# Patient Record
Sex: MALE | Race: WHITE | NOT HISPANIC OR LATINO | Employment: OTHER | URBAN - METROPOLITAN AREA
[De-identification: names, ages, dates, MRNs, and addresses within clinical notes are randomized per-mention and may not be internally consistent; named-entity substitution may affect disease eponyms.]

---

## 2023-03-21 ENCOUNTER — TELEPHONE (OUTPATIENT)
Dept: PRIMARY CARE | Facility: CLINIC | Age: 78
End: 2023-03-21
Payer: MEDICARE

## 2023-03-25 LAB — URINE CULTURE: ABNORMAL

## 2023-11-28 ENCOUNTER — OFFICE VISIT (OUTPATIENT)
Dept: PRIMARY CARE | Facility: CLINIC | Age: 78
End: 2023-11-28
Payer: MEDICARE

## 2023-11-28 ENCOUNTER — LAB (OUTPATIENT)
Dept: LAB | Facility: LAB | Age: 78
End: 2023-11-28
Payer: MEDICARE

## 2023-11-28 VITALS
HEART RATE: 88 BPM | WEIGHT: 203 LBS | TEMPERATURE: 97.3 F | HEIGHT: 70 IN | OXYGEN SATURATION: 96 % | BODY MASS INDEX: 29.06 KG/M2 | DIASTOLIC BLOOD PRESSURE: 60 MMHG | SYSTOLIC BLOOD PRESSURE: 104 MMHG

## 2023-11-28 DIAGNOSIS — C61 PROSTATE CANCER METASTATIC TO BONE (MULTI): ICD-10-CM

## 2023-11-28 DIAGNOSIS — Z00.00 MEDICARE ANNUAL WELLNESS VISIT, SUBSEQUENT: Primary | ICD-10-CM

## 2023-11-28 DIAGNOSIS — C79.51 PROSTATE CANCER METASTATIC TO BONE (MULTI): ICD-10-CM

## 2023-11-28 DIAGNOSIS — R17 JAUNDICE: ICD-10-CM

## 2023-11-28 DIAGNOSIS — I71.40 ABDOMINAL AORTIC ANEURYSM (AAA) WITHOUT RUPTURE, UNSPECIFIED PART (CMS-HCC): ICD-10-CM

## 2023-11-28 DIAGNOSIS — E11.9 TYPE 2 DIABETES MELLITUS WITHOUT COMPLICATION, WITHOUT LONG-TERM CURRENT USE OF INSULIN (MULTI): ICD-10-CM

## 2023-11-28 DIAGNOSIS — E78.5 HYPERLIPIDEMIA, UNSPECIFIED HYPERLIPIDEMIA TYPE: ICD-10-CM

## 2023-11-28 PROBLEM — M85.80 OSTEOPENIA: Status: ACTIVE | Noted: 2023-11-28

## 2023-11-28 PROBLEM — R33.9 URINARY RETENTION: Status: RESOLVED | Noted: 2020-11-30 | Resolved: 2023-11-28

## 2023-11-28 PROBLEM — R35.1 NOCTURIA: Status: ACTIVE | Noted: 2020-11-30

## 2023-11-28 LAB
ALBUMIN SERPL BCP-MCNC: 3.7 G/DL (ref 3.4–5)
ALP SERPL-CCNC: 127 U/L (ref 33–136)
ALT SERPL W P-5'-P-CCNC: 179 U/L (ref 10–52)
ANION GAP SERPL CALC-SCNC: 11 MMOL/L (ref 10–20)
AST SERPL W P-5'-P-CCNC: 100 U/L (ref 9–39)
BASOPHILS # BLD AUTO: 0.04 X10*3/UL (ref 0–0.1)
BASOPHILS NFR BLD AUTO: 0.7 %
BILIRUB SERPL-MCNC: 8 MG/DL (ref 0–1.2)
BUN SERPL-MCNC: 11 MG/DL (ref 6–23)
CALCIUM SERPL-MCNC: 8.8 MG/DL (ref 8.6–10.3)
CHLORIDE SERPL-SCNC: 103 MMOL/L (ref 98–107)
CO2 SERPL-SCNC: 29 MMOL/L (ref 21–32)
CREAT SERPL-MCNC: 0.89 MG/DL (ref 0.5–1.3)
EOSINOPHIL # BLD AUTO: 0.13 X10*3/UL (ref 0–0.4)
EOSINOPHIL NFR BLD AUTO: 2.3 %
ERYTHROCYTE [DISTWIDTH] IN BLOOD BY AUTOMATED COUNT: 14.8 % (ref 11.5–14.5)
GFR SERPL CREATININE-BSD FRML MDRD: 87 ML/MIN/1.73M*2
GLUCOSE SERPL-MCNC: 128 MG/DL (ref 74–99)
HCT VFR BLD AUTO: 41.1 % (ref 41–52)
HGB BLD-MCNC: 13.3 G/DL (ref 13.5–17.5)
IMM GRANULOCYTES # BLD AUTO: 0.01 X10*3/UL (ref 0–0.5)
IMM GRANULOCYTES NFR BLD AUTO: 0.2 % (ref 0–0.9)
LYMPHOCYTES # BLD AUTO: 0.91 X10*3/UL (ref 0.8–3)
LYMPHOCYTES NFR BLD AUTO: 16 %
MCH RBC QN AUTO: 31.4 PG (ref 26–34)
MCHC RBC AUTO-ENTMCNC: 32.4 G/DL (ref 32–36)
MCV RBC AUTO: 97 FL (ref 80–100)
MONOCYTES # BLD AUTO: 0.71 X10*3/UL (ref 0.05–0.8)
MONOCYTES NFR BLD AUTO: 12.5 %
NEUTROPHILS # BLD AUTO: 3.87 X10*3/UL (ref 1.6–5.5)
NEUTROPHILS NFR BLD AUTO: 68.3 %
NRBC BLD-RTO: 0 /100 WBCS (ref 0–0)
PLATELET # BLD AUTO: 182 X10*3/UL (ref 150–450)
POTASSIUM SERPL-SCNC: 3.8 MMOL/L (ref 3.5–5.3)
PROT SERPL-MCNC: 6.4 G/DL (ref 6.4–8.2)
RBC # BLD AUTO: 4.23 X10*6/UL (ref 4.5–5.9)
SODIUM SERPL-SCNC: 139 MMOL/L (ref 136–145)
WBC # BLD AUTO: 5.7 X10*3/UL (ref 4.4–11.3)

## 2023-11-28 PROCEDURE — 1159F MED LIST DOCD IN RCRD: CPT | Performed by: NURSE PRACTITIONER

## 2023-11-28 PROCEDURE — 80053 COMPREHEN METABOLIC PANEL: CPT

## 2023-11-28 PROCEDURE — 1160F RVW MEDS BY RX/DR IN RCRD: CPT | Performed by: NURSE PRACTITIONER

## 2023-11-28 PROCEDURE — 99213 OFFICE O/P EST LOW 20 MIN: CPT | Performed by: NURSE PRACTITIONER

## 2023-11-28 PROCEDURE — G0439 PPPS, SUBSEQ VISIT: HCPCS | Performed by: NURSE PRACTITIONER

## 2023-11-28 PROCEDURE — 85025 COMPLETE CBC W/AUTO DIFF WBC: CPT

## 2023-11-28 PROCEDURE — 1170F FXNL STATUS ASSESSED: CPT | Performed by: NURSE PRACTITIONER

## 2023-11-28 PROCEDURE — 36415 COLL VENOUS BLD VENIPUNCTURE: CPT

## 2023-11-28 RX ORDER — ABIRATERONE ACETATE 250 MG/1
1000 TABLET ORAL DAILY
COMMUNITY
Start: 2021-08-20

## 2023-11-28 RX ORDER — ATORVASTATIN CALCIUM 40 MG/1
1 TABLET, FILM COATED ORAL DAILY
COMMUNITY
Start: 2022-06-28

## 2023-11-28 RX ORDER — SEMAGLUTIDE 1.34 MG/ML
INJECTION, SOLUTION SUBCUTANEOUS
COMMUNITY
Start: 2022-11-30

## 2023-11-28 RX ORDER — PREDNISONE 5 MG/1
5 TABLET ORAL DAILY
COMMUNITY
Start: 2022-06-28

## 2023-11-28 RX ORDER — VARENICLINE TARTRATE 1 MG/1
TABLET, FILM COATED ORAL
COMMUNITY
Start: 2023-11-14

## 2023-11-28 RX ORDER — VARENICLINE TARTRATE 0.5 MG/1
TABLET, FILM COATED ORAL
COMMUNITY
Start: 2023-11-14

## 2023-11-28 RX ORDER — ALFUZOSIN HYDROCHLORIDE 10 MG/1
1 TABLET, EXTENDED RELEASE ORAL DAILY
COMMUNITY
Start: 2022-06-28

## 2023-11-28 RX ORDER — CALCIUM CITRATE/VITAMIN D3 200MG-6.25
TABLET ORAL
COMMUNITY
Start: 2023-11-14 | End: 2023-11-28 | Stop reason: ALTCHOICE

## 2023-11-28 RX ORDER — ASPIRIN 81 MG/1
81 TABLET ORAL
COMMUNITY

## 2023-11-28 RX ORDER — METFORMIN HYDROCHLORIDE 500 MG/1
1000 TABLET, EXTENDED RELEASE ORAL
COMMUNITY
Start: 2023-09-08

## 2023-11-28 ASSESSMENT — ENCOUNTER SYMPTOMS
FATIGUE: 0
SHORTNESS OF BREATH: 0
DIARRHEA: 0
ABDOMINAL PAIN: 0
NAUSEA: 0
NUMBNESS: 0
FEVER: 0
HEADACHES: 0
PALPITATIONS: 0
COUGH: 0
DIZZINESS: 0
CHEST TIGHTNESS: 0
VOMITING: 0
CHILLS: 0
WEAKNESS: 0

## 2023-11-28 ASSESSMENT — ACTIVITIES OF DAILY LIVING (ADL)
TAKING_MEDICATION: INDEPENDENT
BATHING: INDEPENDENT
GROCERY_SHOPPING: INDEPENDENT
DRESSING: INDEPENDENT
DOING_HOUSEWORK: INDEPENDENT
MANAGING_FINANCES: INDEPENDENT

## 2023-11-28 ASSESSMENT — PATIENT HEALTH QUESTIONNAIRE - PHQ9
SUM OF ALL RESPONSES TO PHQ9 QUESTIONS 1 AND 2: 0
2. FEELING DOWN, DEPRESSED OR HOPELESS: NOT AT ALL
1. LITTLE INTEREST OR PLEASURE IN DOING THINGS: NOT AT ALL
SUM OF ALL RESPONSES TO PHQ9 QUESTIONS 1 AND 2: 0
2. FEELING DOWN, DEPRESSED OR HOPELESS: NOT AT ALL
1. LITTLE INTEREST OR PLEASURE IN DOING THINGS: NOT AT ALL

## 2023-11-28 NOTE — PROGRESS NOTES
"Subjective   Reason for Visit: Sydney Fitzpatrick is an 78 y.o. male here for a Medicare Wellness visit.     Past Medical, Surgical, and Family History reviewed and updated in chart.    Reviewed all medications by prescribing practitioner or clinical pharmacist (such as prescriptions, OTCs, herbal therapies and supplements) and documented in the medical record.    HPI  He presents to the office today for a Medicare Wellness Visit and follow up. [facility]  He reports he is doing well and has not concerns today.  Monday morning 3 weeks ago had an episode of vomiting 7 times. He has not had any further.  No abdominal pain reported.  No nausea, vomiting, diarrhea or constipation.  No fever or chills.   No constipation.  [facility].  Of note he appears jaundice today.   He reports he has not noticed a difference in his color nor has his wife.   He had [facility] at the [facility] 2 weeks ago (not available to review).    Checks blood sugars once a week- ranging   [facility] management [facility].  [facility]    Patient Care Team:  YVON Ryan-CNP as PCP - General     Review of Systems   Constitutional:  Negative for chills, fatigue and fever.   Eyes:  Negative for visual disturbance.   Respiratory:  Negative for cough, chest tightness and shortness of breath.    Cardiovascular:  Negative for chest pain, palpitations and leg swelling.   Gastrointestinal:  Negative for abdominal pain, diarrhea, nausea and vomiting.   Neurological:  Negative for dizziness, weakness, numbness and headaches.     Objective   Vitals:  /60 (BP Location: Left arm, Patient Position: Sitting)   Pulse 88   Temp 36.3 °C (97.3 °F) (Temporal)   Ht 1.766 m (5' 9.53\")   Wt 92.1 kg (203 lb)   SpO2 96%   BMI 29.52 kg/m²       Physical Exam  Constitutional:       " General: He is not in acute distress.     Appearance: Normal appearance. He is not toxic-appearing.      Comments: (+) icterus   Eyes:      General: Scleral icterus present.      Extraocular Movements: Extraocular movements intact.      Conjunctiva/sclera: Conjunctivae normal.      Pupils: Pupils are equal, round, and reactive to light.   Neck:      Vascular: No carotid bruit.   Cardiovascular:      Rate and Rhythm: Normal rate and regular rhythm.      Pulses: Normal pulses.      Heart sounds: Normal heart sounds, S1 normal and S2 normal. No murmur heard.  Pulmonary:      Effort: Pulmonary effort is normal. No respiratory distress.      Breath sounds: Normal breath sounds.   Abdominal:      General: Bowel sounds are normal.      Palpations: Abdomen is soft.      Tenderness: There is no abdominal tenderness.   Musculoskeletal:      Right lower leg: No edema.      Left lower leg: No edema.   Lymphadenopathy:      Cervical: No cervical adenopathy.   Neurological:      Mental Status: He is alert and oriented to person, place, and time.   Psychiatric:         Attention and Perception: Attention normal.         Mood and Affect: Mood and affect normal.         Behavior: Behavior normal. Behavior is cooperative.         Thought Content: Thought content normal.         Cognition and Memory: Cognition normal.         Judgment: Judgment normal.       Assessment/Plan   Problem List Items Addressed This Visit       Abdominal aortic aneurysm (CMS/HCC)    Overview     Formatting of this note might be different from the original. - Discovered on screening - Increased in size to 4.2 cm - follows with vascular Last Assessment & Plan: Formatting of this note might be different from the original. - awaiting CTA - unsure if will continue to follow with vascular here or wait until he moves         Current Assessment & Plan     Had recent Abdominal aortic US- [facility].         Diabetes mellitus, type II (CMS/HCC)    Overview      Last Assessment & Plan: Formatting of this note might be different from the original. - at goal without hypoglycemia - on appropriate risk reduction medication         Current Assessment & Plan     Will request labs from the [facility]- managed by [facility].         Prostate cancer metastatic to bone (CMS/HCC)    Overview     Last Assessment & Plan: Formatting of this note might be different from the original. - PSA currently undetectable - on 2 antiandrogens - also on medication for bone metastases - follows with urology         Current Assessment & Plan     [facility].         Hyperlipidemia    Current Assessment & Plan     On Statin.         Medicare annual wellness visit, subsequent - Primary    Current Assessment & Plan     UTD on age appropriate screenings and vaccines.         Jaundice    Current Assessment & Plan     He reports he is feeling great- no concerns today. He has not noticed the skin discoloration/yellowing.  He had a vomiting episode about 3 weeks ago but otherwise denies any other symptoms. No fever or chills.   Will recheck labs today. Advised many of his meds can also affect his liver.         Relevant Orders    Comprehensive Metabolic Panel (Completed)    CBC and Auto Differential (Completed)

## 2023-11-29 ENCOUNTER — TELEPHONE (OUTPATIENT)
Dept: PRIMARY CARE | Facility: CLINIC | Age: 78
End: 2023-11-29
Payer: MEDICARE

## 2023-11-29 NOTE — ASSESSMENT & PLAN NOTE
He reports he is feeling great- no concerns today. He has not noticed the skin discoloration/yellowing.  He had a vomiting episode about 3 weeks ago but otherwise denies any other symptoms. No fever or chills.   Will recheck labs today. Advised many of his meds can also affect his liver.

## 2023-11-29 NOTE — TELEPHONE ENCOUNTER
Pts wife stopped in because she wanted to know how Casey was doing in surgery. She said he is having Surgery at [facility] for his gallbladder and she wanted you to call her back at [phone].

## 2023-12-04 ENCOUNTER — PATIENT OUTREACH (OUTPATIENT)
Dept: PRIMARY CARE | Facility: CLINIC | Age: 78
End: 2023-12-04
Payer: MEDICARE

## 2023-12-04 RX ORDER — OXYCODONE HYDROCHLORIDE 5 MG/1
5 TABLET ORAL EVERY 6 HOURS PRN
COMMUNITY
Start: 2023-12-03 | End: 2023-12-06

## 2023-12-04 RX ORDER — AMOXICILLIN AND CLAVULANATE POTASSIUM 500; 125 MG/1; MG/1
TABLET, FILM COATED ORAL
COMMUNITY
Start: 2016-04-12 | End: 2023-12-11 | Stop reason: ALTCHOICE

## 2023-12-04 NOTE — PROGRESS NOTES
Discharge Facility: [facility]   Discharge Diagnosis: Choledocholithiasis with acute cholecystiti   Admission Date: 11-29-23  Discharge Date: 12-3-23     PCP Appointment Date: 12-11-23   Specialist Appointment Date:  n/a   Hospital Encounter and Summary:  not available at this time   See discharge assessment below for further details  Engagement  Call Start Time: 1019 (12/4/2023 10:21 AM)    Medications  Medications reviewed with patient/caregiver?: Yes (12/4/2023 10:21 AM)  Is the patient having any side effects they believe may be caused by any medication additions or changes?: No (12/4/2023 10:21 AM)  Does the patient have all medications ordered at discharge?: Yes (12/4/2023 10:21 AM)  Care Management Interventions: No intervention needed (12/4/2023 10:21 AM)  Is the patient taking all medications as directed (includes completed medication regime)?: Yes (12/4/2023 10:21 AM)  Care Management Interventions: Provided patient education (12/4/2023 10:21 AM)    Appointments  Does the patient have a primary care provider?: Yes (12/4/2023 10:21 AM)  Care Management Interventions: Verified appointment date/time/provider (12/4/2023 10:21 AM)  Has the patient kept scheduled appointments due by today?: Yes (12/4/2023 10:21 AM)  Care Management Interventions: Advised patient to keep appointment (12/4/2023 10:21 AM)    Self Management  Has home health visited the patient within 72 hours of discharge?: Not applicable (12/4/2023 10:21 AM)    Patient Teaching  Does the patient have access to their discharge instructions?: Yes (12/4/2023 10:21 AM)  Care Management Interventions: Reviewed instructions with patient (12/4/2023 10:21 AM)  What is the patient's perception of their health status since discharge?: Improving (12/4/2023 10:21 AM)  Is the patient/caregiver able to teach back the hierarchy of who to call/visit for symptoms/problems? PCP, Specialist, Home Health nurse, Urgent Care, ED, 911: Yes (12/4/2023 10:21  AM)      Simon Oliver LPN

## 2023-12-11 ENCOUNTER — OFFICE VISIT (OUTPATIENT)
Dept: PRIMARY CARE | Facility: CLINIC | Age: 78
End: 2023-12-11
Payer: MEDICARE

## 2023-12-11 VITALS
OXYGEN SATURATION: 95 % | BODY MASS INDEX: 28.62 KG/M2 | TEMPERATURE: 97.9 F | HEART RATE: 89 BPM | DIASTOLIC BLOOD PRESSURE: 62 MMHG | SYSTOLIC BLOOD PRESSURE: 126 MMHG | WEIGHT: 196.8 LBS

## 2023-12-11 DIAGNOSIS — K80.50 CHOLEDOCHOLITHIASIS: Primary | ICD-10-CM

## 2023-12-11 DIAGNOSIS — R17 JAUNDICE: ICD-10-CM

## 2023-12-11 DIAGNOSIS — K80.81 BILIARY CALCULUS OF OTHER SITE WITH OBSTRUCTION: ICD-10-CM

## 2023-12-11 DIAGNOSIS — D64.9 ANEMIA, UNSPECIFIED TYPE: ICD-10-CM

## 2023-12-11 PROBLEM — K80.20 CHOLELITHIASIS: Status: ACTIVE | Noted: 2023-12-11

## 2023-12-11 PROCEDURE — 1159F MED LIST DOCD IN RCRD: CPT | Performed by: NURSE PRACTITIONER

## 2023-12-11 PROCEDURE — 1160F RVW MEDS BY RX/DR IN RCRD: CPT | Performed by: NURSE PRACTITIONER

## 2023-12-11 PROCEDURE — 99495 TRANSJ CARE MGMT MOD F2F 14D: CPT | Performed by: NURSE PRACTITIONER

## 2023-12-11 ASSESSMENT — ENCOUNTER SYMPTOMS
DIARRHEA: 1
CONSTIPATION: 0
ABDOMINAL PAIN: 1
SHORTNESS OF BREATH: 0
CHILLS: 0
COUGH: 0
NAUSEA: 0
DIZZINESS: 0
PALPITATIONS: 0
VOMITING: 0
CHEST TIGHTNESS: 0
FATIGUE: 0
FEVER: 0

## 2023-12-11 ASSESSMENT — PATIENT HEALTH QUESTIONNAIRE - PHQ9
1. LITTLE INTEREST OR PLEASURE IN DOING THINGS: NOT AT ALL
SUM OF ALL RESPONSES TO PHQ9 QUESTIONS 1 AND 2: 0
2. FEELING DOWN, DEPRESSED OR HOPELESS: NOT AT ALL

## 2023-12-11 NOTE — PROGRESS NOTES
Subjective    Sydnye Fitzpatrick is a 78 y.o. male who presents for Hospital Follow-up (Removal of gallbladder).    HPI  He presents to the office today for a hospital follow up. He was discharged 12/3.  He was sent to the ER after last visit with jaundice and elevated bilirubin 8.0.  CT abdomen and pelvis (11/29) showed cholelithiasis with mild pericholecystic fluid and gallbladder wall thickening consistent with acute cholecystitis.  Dilated CBD with intraluminal material within the distal duct concerning for choledocholithiasis. ERCP on 11/30 with   sphincterotomy/stent placement.    On 12/01/2023 had a laparoscopic cholecystectomy (Inflamed gallbladder with sludge, adhesions noted to the omentum).    He had post-op hypoxia, CXR with   bilateral pleural effusions, elevated BNP., Lasix given x 1 dose.     He reports he has been walking around.  He reports he is passing gas.  He reports he has been having explosive diarrhea but usually once a day but at times mores. He reports this was present well before the gallbladder issues.  He reports he is not having pain all the time but still has pain with deep breaths which has been present since leaving the hospital.  It is also hard to lay down and get comfortable at [facility].  He still has sharp pains at times.  No improvement and not worse from discharge.  No fever or chills.   Appetite has not been good.  Weight is done about 7 lbs since last visit.   No nausea or vomiting.     No chest pain, palpitation or SOB.  No cough.   He has a surgery follow up 10/18/2023    Review of Systems   Constitutional:  Negative for chills, fatigue and fever.   Eyes:  Negative for visual disturbance.   Respiratory:  Negative for cough, chest tightness and shortness of breath.    Cardiovascular:  Negative for chest pain, palpitations and leg swelling.   Gastrointestinal:  Positive for abdominal pain and diarrhea. Negative for constipation, nausea and vomiting.   Neurological:  Negative for  dizziness.     Objective   /62 (BP Location: Left arm, Patient Position: Sitting)   Pulse 89   Temp 36.6 °C (97.9 °F) (Temporal)   Wt 89.3 kg (196 lb 12.8 oz)   SpO2 95%   BMI 28.62 kg/m²     Physical Exam  Constitutional:       General: He is not in acute distress.     Appearance: Normal appearance. He is not toxic-appearing.   Eyes:      Extraocular Movements: Extraocular movements intact.      Conjunctiva/sclera: Conjunctivae normal.      Pupils: Pupils are equal, round, and reactive to light.   Neck:      Vascular: No carotid bruit.   Cardiovascular:      Rate and Rhythm: Normal rate and regular rhythm.      Pulses: Normal pulses.      Heart sounds: Normal heart sounds, S1 normal and S2 normal. No murmur heard.  Pulmonary:      Effort: Pulmonary effort is normal. No respiratory distress.      Breath sounds: Normal breath sounds.   Abdominal:      General: Bowel sounds are normal.      Palpations: Abdomen is soft.      Tenderness: There is no abdominal tenderness.      Comments: 5 lap sites all healing well.   (+) tenderness upon palpation to the right side of the  abdomen.   Musculoskeletal:      Right lower leg: No edema.      Left lower leg: No edema.   Lymphadenopathy:      Cervical: No cervical adenopathy.   Neurological:      Mental Status: He is alert and oriented to person, place, and time.   Psychiatric:         Attention and Perception: Attention normal.         Mood and Affect: Mood and affect normal.         Behavior: Behavior normal. Behavior is cooperative.         Thought Content: Thought content normal.         Cognition and Memory: Cognition normal.         Judgment: Judgment normal.         Assessment/Plan   Problem List Items Addressed This Visit       Jaundice     Resolved.          Choledocholithiasis     Symptoms better- needs to follow up with GI in 6-8 weeks.  Still with abdominal pain post surgery-mainly with deep breath.         Anemia - Primary     Check labs to ensure  stable.         Relevant Orders    CBC    Cholelithiasis    Relevant Orders    Comprehensive Metabolic Panel       It has been a pleasure seeing you today!

## 2023-12-12 NOTE — ASSESSMENT & PLAN NOTE
Symptoms better- needs to follow up with GI in 6-8 weeks.  Still with abdominal pain post surgery-mainly with deep breath.

## 2023-12-19 ENCOUNTER — PATIENT OUTREACH (OUTPATIENT)
Dept: PRIMARY CARE | Facility: CLINIC | Age: 78
End: 2023-12-19
Payer: MEDICARE

## 2023-12-19 NOTE — PROGRESS NOTES
Unable to reach patient for call back after patient's follow up appointment with PCP.   LVM with call back number for patient to call if needed   If no voicemail available call attempts x 2 were made to contact the patient to assist with any questions or concerns patient may have.    Simon Oliver LPN

## 2024-01-03 ENCOUNTER — PATIENT OUTREACH (OUTPATIENT)
Dept: CARE COORDINATION | Facility: CLINIC | Age: 79
End: 2024-01-03
Payer: MEDICARE

## 2024-01-03 NOTE — PROGRESS NOTES
Unable to reach patient for one month post discharge follow up call.   LVM with call back number for patient to call if needed   If no voicemail available call attempts x 2 were made to contact the patient to assist with any questions or concerns patient may have.  Simon Oliver LPN

## 2024-01-23 ENCOUNTER — TELEPHONE (OUTPATIENT)
Dept: PRIMARY CARE | Facility: CLINIC | Age: 79
End: 2024-01-23
Payer: MEDICARE

## 2024-01-23 NOTE — TELEPHONE ENCOUNTER
Pt said he doesn't know a Dr Neves but it might be someone from his gallbladder surgery - pt does give you permission to speak with this Doctor.

## 2024-01-23 NOTE — TELEPHONE ENCOUNTER
Dr Neves is requesting you call him. He did not go into detail on why he wanted you to call him.    Best number is [phone]

## 2024-03-04 ENCOUNTER — PATIENT OUTREACH (OUTPATIENT)
Dept: CARE COORDINATION | Facility: CLINIC | Age: 79
End: 2024-03-04
Payer: MEDICARE

## 2024-03-04 NOTE — PROGRESS NOTES
Call placed regarding 90 days post discharge follow up call.  At time of outreach call the patient feels as if their condition has (improved) since initial visit with PCP or specialist.  Questions or concerns regarding recovery period addressed at this time.   Reviewed any PCP or specialists progress notes/labs/radiology reports if applicable and addressed any questions or concerns.     PCP Requested Referral   Follow-Up Appointment   Please make an appointment with Dr. Dai for an ERCP in 6-8 weeks for removal of plastic biliary stent and clearance of the bile duct stones left in situ.   When: In 6 weeks   Patient/Parents to call for appointment?: Yes   Jose Angel Dai MD   [phone]   [address] [place] [address]  [place] [address]     Pt. Was sent home from [facility] with discharge instructions to follow up with the provider listed above. When patient calls to schedule follow up per discharge papers, they tell him this is not the correct office.     Pt. Still has the Plastic Biliary Stent in place.    Pt. Is going to call the number provided to him on this date.         Simon Oliver LPN

## 2024-04-02 ENCOUNTER — DOCUMENTATION (OUTPATIENT)
Dept: PRIMARY CARE | Facility: CLINIC | Age: 79
End: 2024-04-02
Payer: MEDICARE

## 2024-04-03 ENCOUNTER — PATIENT OUTREACH (OUTPATIENT)
Dept: CARE COORDINATION | Facility: CLINIC | Age: 79
End: 2024-04-03
Payer: MEDICARE

## 2024-04-03 NOTE — PROGRESS NOTES
Discharge Facility: [facility]   Discharge Diagnosis: Elevated troponin   Admission Date: 3-30-24   Discharge Date: 4-1-24     PCP Appointment Date: 4-5-24  Specialist Appointment Date:  Cardiology   YVON Yanez.CNP (Attending)  NPI: [id number]  [phone] (Work)  [phone] (Fax)  [address] [place] [address]  [facility] Encounter and Summary: Linked   See discharge assessment below for further details  Engagement  Call Start Time: 1316 (4/3/2024  1:33 PM)    Medications  Medications reviewed with patient/caregiver?: Yes (4/3/2024  1:33 PM)  Is the patient having any side effects they believe may be caused by any medication additions or changes?: No (4/3/2024  1:33 PM)  Does the patient have all medications ordered at discharge?: Yes (4/3/2024  1:33 PM)  Care Management Interventions: No intervention needed (4/3/2024  1:33 PM)  Is the patient taking all medications as directed (includes completed medication regime)?: Yes (4/3/2024  1:33 PM)    Appointments  Does the patient have a primary care provider?: Yes (4/3/2024  1:33 PM)  Care Management Interventions: Verified appointment date/time/provider (4/3/2024  1:33 PM)  Has the patient kept scheduled appointments due by today?: Yes (4/3/2024  1:33 PM)  Care Management Interventions: Advised patient to keep appointment (4/3/2024  1:33 PM)    Patient Teaching  Does the patient have access to their discharge instructions?: Yes (4/3/2024  1:33 PM)  Care Management Interventions: Reviewed instructions with patient (4/3/2024  1:33 PM)  What is the patient's perception of their health status since discharge?: Improving (4/3/2024  1:33 PM)  Is the patient/caregiver able to teach back the hierarchy of who to call/visit for symptoms/problems? PCP, Specialist, Home Health nurse, Urgent Care, ED, 911: Yes (4/3/2024  1:33 PM)      Simon Oliver LPN

## 2024-04-05 ENCOUNTER — OFFICE VISIT (OUTPATIENT)
Dept: PRIMARY CARE | Facility: CLINIC | Age: 79
End: 2024-04-05
Payer: MEDICARE

## 2024-04-05 VITALS
DIASTOLIC BLOOD PRESSURE: 70 MMHG | TEMPERATURE: 98 F | OXYGEN SATURATION: 98 % | WEIGHT: 202.8 LBS | BODY MASS INDEX: 29.5 KG/M2 | SYSTOLIC BLOOD PRESSURE: 110 MMHG | HEART RATE: 88 BPM

## 2024-04-05 DIAGNOSIS — I10 HYPERTENSION, UNSPECIFIED TYPE: Primary | ICD-10-CM

## 2024-04-05 DIAGNOSIS — E87.1 HYPONATREMIA: ICD-10-CM

## 2024-04-05 DIAGNOSIS — R91.1 PULMONARY NODULE: ICD-10-CM

## 2024-04-05 DIAGNOSIS — C79.51 PROSTATE CANCER METASTATIC TO BONE (MULTI): ICD-10-CM

## 2024-04-05 DIAGNOSIS — I49.8 BIGEMINY: ICD-10-CM

## 2024-04-05 DIAGNOSIS — I71.40 ABDOMINAL AORTIC ANEURYSM (AAA) WITHOUT RUPTURE, UNSPECIFIED PART (CMS-HCC): ICD-10-CM

## 2024-04-05 DIAGNOSIS — C61 PROSTATE CANCER METASTATIC TO BONE (MULTI): ICD-10-CM

## 2024-04-05 PROCEDURE — 1159F MED LIST DOCD IN RCRD: CPT | Performed by: NURSE PRACTITIONER

## 2024-04-05 PROCEDURE — 1160F RVW MEDS BY RX/DR IN RCRD: CPT | Performed by: NURSE PRACTITIONER

## 2024-04-05 PROCEDURE — 3078F DIAST BP <80 MM HG: CPT | Performed by: NURSE PRACTITIONER

## 2024-04-05 PROCEDURE — 3074F SYST BP LT 130 MM HG: CPT | Performed by: NURSE PRACTITIONER

## 2024-04-05 PROCEDURE — 99495 TRANSJ CARE MGMT MOD F2F 14D: CPT | Performed by: NURSE PRACTITIONER

## 2024-04-05 RX ORDER — LISINOPRIL 20 MG/1
20 TABLET ORAL
COMMUNITY
Start: 2024-04-02

## 2024-04-05 RX ORDER — AMLODIPINE BESYLATE 10 MG/1
10 TABLET ORAL
COMMUNITY
Start: 2024-04-02

## 2024-04-05 ASSESSMENT — ENCOUNTER SYMPTOMS
OCCASIONAL FEELINGS OF UNSTEADINESS: 0
LOSS OF SENSATION IN FEET: 0
DEPRESSION: 0

## 2024-04-05 ASSESSMENT — PATIENT HEALTH QUESTIONNAIRE - PHQ9
1. LITTLE INTEREST OR PLEASURE IN DOING THINGS: NOT AT ALL
SUM OF ALL RESPONSES TO PHQ9 QUESTIONS 1 & 2: 0
2. FEELING DOWN, DEPRESSED OR HOPELESS: NOT AT ALL

## 2024-04-05 NOTE — PATIENT INSTRUCTIONS
Continue medications at the current dose.  Continue to check home BP readings.  Let me know if you have readings less than 100/60.

## 2024-04-05 NOTE — PROGRESS NOTES
Subjective   Sydney Fitzpatrick is a 79 y.o. male who presents for Hospital Follow-up ([facility] hospital stay on [facility]).    HPI  He presents to the office today for a hospital follow up. He reports on 3/30/2024 felt out of sorts and had a headache. Head felt like it was it was going to explode.   He had to take his wife to urgent care and had his blood pressure checked while there which was extremely elevated and he was sent to the ER.  BP was 203/127 in the ER.  He has never had issues with his blood pressure in the past and was not on medications.  His troponin was elevated and he was admitted.  He had an ECHO which showed EF 52% and mild LVH  He had a Holter monitor placed prior to discharge due to bigeminy while in [facility].   No chest pain, SOB or palpitations.   No numbness/tingling/weakness or dizziness.  No vision changes.  Home BP readings at home 130/86  He reports he is feeling much better now.  He does not have any concerns today.  While he was in the hospital there was a pulmonary nodule noted on his chest x-ray.  He has a follow-up with the nodule clinic at [facility].    He has an appointment with a nodule clinic on 6/20/2024  Has a follow up with cardiology on 5/13/2024        Review of Systems   Constitutional:  Negative for chills, fatigue and fever.   Eyes:  Negative for visual disturbance.   Respiratory:  Negative for cough, chest tightness and shortness of breath.    Cardiovascular:  Negative for chest pain, palpitations and leg swelling.   Gastrointestinal:  Negative for abdominal pain, nausea and vomiting.   Neurological:  Negative for dizziness, weakness, numbness and headaches.       Objective   /70   Pulse 88   Temp 36.7 °C (98 °F) (Temporal)   Wt 92 kg (202 lb 12.8 oz)   SpO2 98%   BMI 29.50 kg/m²     Physical Exam  Constitutional:       General: He is not in acute distress.     Appearance: Normal appearance. He is not toxic-appearing.   Eyes:      Extraocular Movements:  Extraocular movements intact.      Conjunctiva/sclera: Conjunctivae normal.      Pupils: Pupils are equal, round, and reactive to light.   Neck:      Vascular: No carotid bruit.   Cardiovascular:      Rate and Rhythm: Normal rate and regular rhythm.      Pulses: Normal pulses.      Heart sounds: Normal heart sounds, S1 normal and S2 normal. No murmur heard.  Pulmonary:      Effort: Pulmonary effort is normal. No respiratory distress.      Breath sounds: Normal breath sounds.   Abdominal:      General: Bowel sounds are normal.      Palpations: Abdomen is soft.      Tenderness: There is no abdominal tenderness.   Musculoskeletal:      Right lower leg: No edema.      Left lower leg: No edema.   Lymphadenopathy:      Cervical: No cervical adenopathy.   Neurological:      Mental Status: He is alert and oriented to person, place, and time.   Psychiatric:         Attention and Perception: Attention normal.         Mood and Affect: Mood and affect normal.         Behavior: Behavior normal. Behavior is cooperative.         Thought Content: Thought content normal.         Cognition and Memory: Cognition normal.         Judgment: Judgment normal.         Assessment/Plan   Problem List Items Addressed This Visit       Abdominal aortic aneurysm (CMS/HCC)     Had recent CT in November 2023.  Will see if he is following with vascular.         Prostate cancer metastatic to bone (CMS/HCC)     He follows with oncology for treatment.         Hypertension - Primary     Well-controlled today.  Advised to continue to monitor at home and let me know if should run too low.  He has a follow-up with cardiology in May.         Pulmonary nodule     He has a follow-up in the [facility] nodule clinic.         Bigeminy     He is currently wearing a Holter today.  He has a follow-up with cardiology in May.         Relevant Medications    amLODIPine (Norvasc) 10 mg tablet       It has been a pleasure seeing you today!

## 2024-04-06 PROBLEM — E87.1 HYPONATREMIA: Status: ACTIVE | Noted: 2024-04-06

## 2024-04-06 PROBLEM — I49.8 BIGEMINY: Status: ACTIVE | Noted: 2024-04-06

## 2024-04-06 ASSESSMENT — ENCOUNTER SYMPTOMS
ABDOMINAL PAIN: 0
VOMITING: 0
SHORTNESS OF BREATH: 0
DIZZINESS: 0
FATIGUE: 0
COUGH: 0
FEVER: 0
HEADACHES: 0
PALPITATIONS: 0
CHILLS: 0
NUMBNESS: 0
WEAKNESS: 0
CHEST TIGHTNESS: 0
NAUSEA: 0

## 2024-04-06 NOTE — ASSESSMENT & PLAN NOTE
Well-controlled today.  Advised to continue to monitor at home and let me know if should run too low.  He has a follow-up with cardiology in May.

## 2024-04-06 NOTE — ASSESSMENT & PLAN NOTE
He has a follow-up in the [facility] nodule clinic.

## 2024-04-08 ENCOUNTER — TELEPHONE (OUTPATIENT)
Dept: PRIMARY CARE | Facility: CLINIC | Age: 79
End: 2024-04-08
Payer: MEDICARE

## 2024-04-08 NOTE — TELEPHONE ENCOUNTER
Per HIPAA ok to leave detailed message, LM on pt voicemail with results. He should call back with information that Colette would like to know.

## 2024-04-08 NOTE — TELEPHONE ENCOUNTER
----- Message from JACQUE Ryan sent at 4/6/2024  1:47 AM EDT -----  Please let him know I have placed an order to recheck some blood work at his convenience.  His sodium level was low while in the hospital and it should be rechecked.  The order is placed for him to stop downstairs at his convenience.  The discharge paperwork also states he should wear the Holter monitor for 7 days but did not specify where he is supposed to turn it in.

## 2024-04-09 ENCOUNTER — LAB (OUTPATIENT)
Dept: LAB | Facility: LAB | Age: 79
End: 2024-04-09
Payer: MEDICARE

## 2024-04-09 DIAGNOSIS — E87.1 HYPONATREMIA: ICD-10-CM

## 2024-04-09 PROCEDURE — 36415 COLL VENOUS BLD VENIPUNCTURE: CPT

## 2024-04-09 PROCEDURE — 80053 COMPREHEN METABOLIC PANEL: CPT

## 2024-04-10 LAB
ALBUMIN SERPL BCP-MCNC: 3.8 G/DL (ref 3.4–5)
ALP SERPL-CCNC: 98 U/L (ref 33–136)
ALT SERPL W P-5'-P-CCNC: 17 U/L (ref 10–52)
ANION GAP SERPL CALC-SCNC: 13 MMOL/L (ref 10–20)
AST SERPL W P-5'-P-CCNC: 12 U/L (ref 9–39)
BILIRUB SERPL-MCNC: 0.6 MG/DL (ref 0–1.2)
BUN SERPL-MCNC: 17 MG/DL (ref 6–23)
CALCIUM SERPL-MCNC: 9.4 MG/DL (ref 8.6–10.6)
CHLORIDE SERPL-SCNC: 102 MMOL/L (ref 98–107)
CO2 SERPL-SCNC: 26 MMOL/L (ref 21–32)
CREAT SERPL-MCNC: 1.14 MG/DL (ref 0.5–1.3)
EGFRCR SERPLBLD CKD-EPI 2021: 65 ML/MIN/1.73M*2
GLUCOSE SERPL-MCNC: 211 MG/DL (ref 74–99)
POTASSIUM SERPL-SCNC: 4.5 MMOL/L (ref 3.5–5.3)
PROT SERPL-MCNC: 6.7 G/DL (ref 6.4–8.2)
SODIUM SERPL-SCNC: 136 MMOL/L (ref 136–145)

## 2024-04-12 ENCOUNTER — PATIENT OUTREACH (OUTPATIENT)
Dept: CARE COORDINATION | Facility: CLINIC | Age: 79
End: 2024-04-12
Payer: MEDICARE

## 2024-04-12 NOTE — PROGRESS NOTES
Call regarding appt. with PCP on (4-5-24) after hospitalization.  At time of outreach call the patient feels as if their condition has improved since last visit.  Reviewed the PCP appointment with the pt and addressed any questions or concerns.    Pt. States no questions/concerns at this time, but he would like to let PCP know he is very pleased with his care, and after visit appointment.     Simon Oliver LPN

## 2024-05-01 ENCOUNTER — TELEPHONE (OUTPATIENT)
Dept: PRIMARY CARE | Facility: CLINIC | Age: 79
End: 2024-05-01
Payer: MEDICARE

## 2024-05-01 NOTE — TELEPHONE ENCOUNTER
----- Message from JACQUE Ryan sent at 5/1/2024 11:35 AM EDT -----  Please remind him he should follow up in the lung nodule clinic.  ----- Message -----  From: Claudia Palma  Sent: 5/1/2024  11:25 AM EDT  To: JACQUE Ryan

## 2024-05-03 ENCOUNTER — PATIENT OUTREACH (OUTPATIENT)
Dept: CARE COORDINATION | Facility: CLINIC | Age: 79
End: 2024-05-03
Payer: MEDICARE

## 2024-05-03 NOTE — PROGRESS NOTES
Successful outreach to patient regarding hospitalization as patient continues TCM program.   At time of outreach call the patient feels as if their condition has improved since initial visit with PCP or specialist.  Questions or concerns addressed at this time with patient.   Provided contact information to patient if any further non-emergent needs arise.   Simon Oliver LPN

## 2024-07-02 ENCOUNTER — PATIENT OUTREACH (OUTPATIENT)
Dept: CARE COORDINATION | Facility: CLINIC | Age: 79
End: 2024-07-02
Payer: MEDICARE

## 2024-07-02 NOTE — PROGRESS NOTES
Call placed regarding 90 days post discharge follow up call.  At time of outreach call the patient feels as if their condition has (improved) since initial visit with PCP or specialist.  Questions or concerns regarding recovery period addressed at this time.   Pt. States no questions/concerns at this time.     Reviewed any PCP or specialists progress notes/labs/radiology reports if applicable and addressed any questions or concerns.         Simon Oliver LPN

## 2024-09-10 ENCOUNTER — OFFICE VISIT (OUTPATIENT)
Dept: PRIMARY CARE | Facility: CLINIC | Age: 79
End: 2024-09-10
Payer: MEDICARE

## 2024-09-10 ENCOUNTER — LAB (OUTPATIENT)
Dept: LAB | Facility: LAB | Age: 79
End: 2024-09-10
Payer: MEDICARE

## 2024-09-10 VITALS
WEIGHT: 189.4 LBS | HEART RATE: 89 BPM | OXYGEN SATURATION: 97 % | TEMPERATURE: 97.1 F | SYSTOLIC BLOOD PRESSURE: 160 MMHG | DIASTOLIC BLOOD PRESSURE: 88 MMHG | BODY MASS INDEX: 27.55 KG/M2

## 2024-09-10 DIAGNOSIS — E87.6 HYPOKALEMIA: ICD-10-CM

## 2024-09-10 DIAGNOSIS — I10 HYPERTENSION, UNSPECIFIED TYPE: ICD-10-CM

## 2024-09-10 DIAGNOSIS — Z23 FLU VACCINE NEED: ICD-10-CM

## 2024-09-10 DIAGNOSIS — R17 ELEVATED BILIRUBIN: ICD-10-CM

## 2024-09-10 DIAGNOSIS — G93.41 METABOLIC ENCEPHALOPATHY: Primary | ICD-10-CM

## 2024-09-10 LAB
ALBUMIN SERPL BCP-MCNC: 4.2 G/DL (ref 3.4–5)
ALP SERPL-CCNC: 99 U/L (ref 33–136)
ALT SERPL W P-5'-P-CCNC: 14 U/L (ref 10–52)
ANION GAP SERPL CALC-SCNC: 11 MMOL/L (ref 10–20)
AST SERPL W P-5'-P-CCNC: 14 U/L (ref 9–39)
BILIRUB SERPL-MCNC: 1 MG/DL (ref 0–1.2)
BUN SERPL-MCNC: 11 MG/DL (ref 6–23)
CALCIUM SERPL-MCNC: 9.1 MG/DL (ref 8.6–10.3)
CHLORIDE SERPL-SCNC: 103 MMOL/L (ref 98–107)
CO2 SERPL-SCNC: 27 MMOL/L (ref 21–32)
CREAT SERPL-MCNC: 0.96 MG/DL (ref 0.5–1.3)
EGFRCR SERPLBLD CKD-EPI 2021: 80 ML/MIN/1.73M*2
GLUCOSE SERPL-MCNC: 102 MG/DL (ref 74–99)
POTASSIUM SERPL-SCNC: 4.3 MMOL/L (ref 3.5–5.3)
PROT SERPL-MCNC: 7.2 G/DL (ref 6.4–8.2)
SODIUM SERPL-SCNC: 137 MMOL/L (ref 136–145)

## 2024-09-10 PROCEDURE — 99213 OFFICE O/P EST LOW 20 MIN: CPT | Performed by: NURSE PRACTITIONER

## 2024-09-10 PROCEDURE — 1159F MED LIST DOCD IN RCRD: CPT | Performed by: NURSE PRACTITIONER

## 2024-09-10 PROCEDURE — 1123F ACP DISCUSS/DSCN MKR DOCD: CPT | Performed by: NURSE PRACTITIONER

## 2024-09-10 PROCEDURE — 1158F ADVNC CARE PLAN TLK DOCD: CPT | Performed by: NURSE PRACTITIONER

## 2024-09-10 PROCEDURE — G0008 ADMIN INFLUENZA VIRUS VAC: HCPCS | Performed by: NURSE PRACTITIONER

## 2024-09-10 PROCEDURE — 3077F SYST BP >= 140 MM HG: CPT | Performed by: NURSE PRACTITIONER

## 2024-09-10 PROCEDURE — 80053 COMPREHEN METABOLIC PANEL: CPT

## 2024-09-10 PROCEDURE — 3079F DIAST BP 80-89 MM HG: CPT | Performed by: NURSE PRACTITIONER

## 2024-09-10 PROCEDURE — 1160F RVW MEDS BY RX/DR IN RCRD: CPT | Performed by: NURSE PRACTITIONER

## 2024-09-10 PROCEDURE — 90662 IIV NO PRSV INCREASED AG IM: CPT | Performed by: NURSE PRACTITIONER

## 2024-09-10 PROCEDURE — 36415 COLL VENOUS BLD VENIPUNCTURE: CPT

## 2024-09-10 RX ORDER — POTASSIUM CHLORIDE 20 MEQ/1
1 TABLET, EXTENDED RELEASE ORAL
COMMUNITY
Start: 2024-09-08

## 2024-09-10 ASSESSMENT — ENCOUNTER SYMPTOMS
COUGH: 0
NAUSEA: 0
CHEST TIGHTNESS: 0
WEAKNESS: 0
ABDOMINAL PAIN: 0
CHILLS: 0
DIARRHEA: 0
HEADACHES: 0
SHORTNESS OF BREATH: 0
NUMBNESS: 0
PALPITATIONS: 0
FATIGUE: 0
FEVER: 0
DIZZINESS: 0
VOMITING: 0

## 2024-09-10 ASSESSMENT — PATIENT HEALTH QUESTIONNAIRE - PHQ9
SUM OF ALL RESPONSES TO PHQ9 QUESTIONS 1 AND 2: 0
2. FEELING DOWN, DEPRESSED OR HOPELESS: NOT AT ALL
1. LITTLE INTEREST OR PLEASURE IN DOING THINGS: NOT AT ALL

## 2024-09-10 NOTE — PATIENT INSTRUCTIONS
Check blood work as ordered today.  You received your influenza vaccine.  Bring reports of MRI/CT scan if able.  Check home BP readings and let me know results in one week.

## 2024-09-10 NOTE — PROGRESS NOTES
"Subjective   Sydney Fitzpatrick is a 79 y.o. male who presents for Hospital Follow-up (Was in [place] and was hospitalized for five days. Had HA, and confusion. ) and Flu Vaccine (Pt would like today and all screening questions were answered. /).    HPI  He presents to the office today for a hospital follow up.  He was staying with friend in [place] for a fishing trip.  Was at 8000 ft elevation.  Had taken Diamox for altitude sickness prevention.  Friends noticed he was not acting right.  Off balance.   Stumbling a bit.   Could not form complete sentences- stumbling for words.  He reports being \"delirious.\"  Tried oxygen- no help.  Sent to hospital by ambulance.   Was found to to be dehydrated with a low potassium level.  Diagnosed with hypokalemia and metabolic encephalopathy.  Was there for 4 days.  Had diarrhea while in the hospital.  This has resolved.  Was given fluid and potassium replaced.   Felt better once got to lower altitude before traveling home.   Was sent home on potassium Chloride 20 meq for 4 days.     Today he reports he is feeling well. Other than legs feeling a little stiff and tired he is feeling back to himself.   No pain.  No chest pain, SOB or palpitations.   No abdominal pain, nausea, vomiting or diarrhea.  No fever or chills.   No headaches, numbness, tingling, weakness, or dizziness.   Weight is down 12 lbs since last visit. He attributes this to the Ozempic.      Blood pressure is elevated today. Was off BP medication for several days- just restarted Lisinopril yesterday.  Yesterday home BP reading  173/93  He will continue to monitor at home.  He brought his paperwork and labs but does not have copies of the reports of the imaging. He will drop it off.     Review of Systems   Constitutional:  Negative for chills, fatigue and fever.   Eyes:  Negative for visual disturbance.   Respiratory:  Negative for cough, chest tightness and shortness of breath.    Cardiovascular:  Negative for " chest pain, palpitations and leg swelling.   Gastrointestinal:  Negative for abdominal pain, diarrhea, nausea and vomiting.   Neurological:  Negative for dizziness, weakness, numbness and headaches.     Objective   /88   Pulse 89   Temp 36.2 °C (97.1 °F) (Temporal)   Wt 85.9 kg (189 lb 6.4 oz)   SpO2 97%   BMI 27.55 kg/m²     Physical Exam  Constitutional:       General: He is not in acute distress.     Appearance: Normal appearance. He is not toxic-appearing.   Eyes:      Extraocular Movements: Extraocular movements intact.      Conjunctiva/sclera: Conjunctivae normal.      Pupils: Pupils are equal, round, and reactive to light.   Cardiovascular:      Rate and Rhythm: Normal rate and regular rhythm.      Pulses: Normal pulses.      Heart sounds: Normal heart sounds, S1 normal and S2 normal. No murmur heard.  Pulmonary:      Effort: Pulmonary effort is normal. No respiratory distress.      Breath sounds: Normal breath sounds.   Abdominal:      General: Bowel sounds are normal.      Palpations: Abdomen is soft.      Tenderness: There is no abdominal tenderness.   Musculoskeletal:      Right lower leg: No edema.      Left lower leg: No edema.   Lymphadenopathy:      Cervical: No cervical adenopathy.   Neurological:      Mental Status: He is alert and oriented to person, place, and time.   Psychiatric:         Attention and Perception: Attention normal.         Mood and Affect: Mood and affect normal.         Behavior: Behavior normal. Behavior is cooperative.         Thought Content: Thought content normal.         Cognition and Memory: Cognition normal.         Judgment: Judgment normal.         Assessment/Plan   Problem List Items Addressed This Visit       Hypertension     Elevated today but just resumed his Lisinopril. He will continue to monitor at home. BP has been good on the past couple of visits.          Metabolic encephalopathy - Primary     Back to baseline. He will bring reports of his images.  If he is unable to, advised we will request records.          Other Visit Diagnoses       Flu vaccine need        Relevant Orders    Flu vaccine, trivalent, preservative free, HIGH-DOSE, age 65y+ (Fluzone) (Completed)    Hypokalemia        Relevant Orders    Comprehensive Metabolic Panel (Completed)    Elevated bilirubin        Relevant Orders    Comprehensive Metabolic Panel (Completed)        Will plan to check labs today.  Check CMP to assess potassium level and bilirubin.  Order placed.    It has been a pleasure seeing you today!

## 2024-09-11 ENCOUNTER — TELEPHONE (OUTPATIENT)
Dept: PRIMARY CARE | Facility: CLINIC | Age: 79
End: 2024-09-11
Payer: MEDICARE

## 2024-09-11 NOTE — TELEPHONE ENCOUNTER
Called and spoke to patient.   Let him know results.   Will make follow up appointment for bp when he drops off paperwork later today.

## 2024-09-11 NOTE — TELEPHONE ENCOUNTER
----- Message from Adrianne SEBASTIAN sent at 9/11/2024  8:59 AM EDT -----    ----- Message -----  From: YVON Ryan-DONYA  Sent: 9/10/2024   5:42 PM EDT  To: Adrianne Martinez MA    Please let him know his blood work looked great.  Potassium level was in the normal range.  Bilirubin was normal.

## 2024-09-12 NOTE — ASSESSMENT & PLAN NOTE
Back to baseline. He will bring reports of his images. If he is unable to, advised we will request records.

## 2024-09-12 NOTE — ASSESSMENT & PLAN NOTE
Elevated today but just resumed his Lisinopril. He will continue to monitor at home. BP has been good on the past couple of visits.

## 2024-09-16 ENCOUNTER — TELEPHONE (OUTPATIENT)
Dept: PRIMARY CARE | Facility: CLINIC | Age: 79
End: 2024-09-16
Payer: MEDICARE

## 2024-09-16 NOTE — TELEPHONE ENCOUNTER
----- Message from Colette Henson sent at 9/12/2024 10:14 AM EDT -----  Please see if he is following with vascular for his abdominal aortic aneurysm as this will need monitored.

## 2024-11-12 ENCOUNTER — PATIENT OUTREACH (OUTPATIENT)
Dept: PRIMARY CARE | Facility: CLINIC | Age: 79
End: 2024-11-12

## 2024-11-12 ENCOUNTER — APPOINTMENT (OUTPATIENT)
Dept: PRIMARY CARE | Facility: CLINIC | Age: 79
End: 2024-11-12
Payer: MEDICARE

## 2024-11-12 NOTE — PROGRESS NOTES
Discharge Facility: [facility]  Discharge Diagnosis: TIA, Stroke like symptoms  Admission Date: 10 Nov 24  Discharge Date: 11 Nov 24    PCP Appointment Date: 15 Nov 24 (set by another)  Specialist Appointment Date: None  Hospital Encounter and Summary Linked: Yes    See discharge assessment below for further details     Engagement  Call Start Time: 0925 (Spoke with patient) (11/12/2024  9:35 AM)    Medications  Medications reviewed with patient/caregiver?: Yes (11/12/2024  9:35 AM)  Is the patient having any side effects they believe may be caused by any medication additions or changes?: No (11/12/2024  9:35 AM)  Does the patient have all medications ordered at discharge?: Yes (11/12/2024  9:35 AM)  Prescription Comments: pt able to afford and obtain all medications (11/12/2024  9:35 AM)  Is the patient taking all medications as directed (includes completed medication regime)?: Yes (11/12/2024  9:35 AM)  Medication Comments: pt has no questions in regards to medications at this time. (11/12/2024  9:35 AM)    Appointments  Does the patient have a primary care provider?: Yes (follow up set by another for 15 Nov 24) (11/12/2024  9:35 AM)  Care Management Interventions: Verified appointment date/time/provider (11/12/2024  9:35 AM)  Has the patient kept scheduled appointments due by today?: Yes (11/12/2024  9:35 AM)  Care Management Interventions: Advised patient to keep appointment (11/12/2024  9:35 AM)    Self Management  What is the home health agency?: None (11/12/2024  9:35 AM)  Has home health visited the patient within 72 hours of discharge?: Not applicable (11/12/2024  9:35 AM)  What Durable Medical Equipment (DME) was ordered?: None (11/12/2024  9:35 AM)    Patient Teaching  Does the patient have access to their discharge instructions?: Yes (11/12/2024  9:35 AM)  Care Management Interventions: Reviewed instructions with patient (11/12/2024  9:35 AM)  What is the patient's perception of their health status  since discharge?: Improving (11/12/2024  9:35 AM)  Is the patient/caregiver able to teach back the hierarchy of who to call/visit for symptoms/problems? PCP, Specialist, Home Health nurse, Urgent Care, ED, 911: Yes (11/12/2024  9:35 AM)  Patient/Caregiver Education Comments: None (11/12/2024  9:35 AM)    Wrap Up  Wrap Up Additional Comments: pt has no questions regarding medications or discharge instructions at this time. pt states he is doing well since leaving hospital and that appt with provider is still good for him. (11/12/2024  9:35 AM)  Call End Time: 0935 (11/12/2024  9:35 AM)    Pt grateful for outreach call and is agreeable to being contacted after PCP appt to see how they are doing.

## 2024-11-15 ENCOUNTER — LAB (OUTPATIENT)
Dept: LAB | Facility: LAB | Age: 79
End: 2024-11-15
Payer: MEDICARE

## 2024-11-15 ENCOUNTER — OFFICE VISIT (OUTPATIENT)
Dept: PRIMARY CARE | Facility: CLINIC | Age: 79
End: 2024-11-15
Payer: MEDICARE

## 2024-11-15 VITALS
TEMPERATURE: 97.2 F | HEART RATE: 98 BPM | DIASTOLIC BLOOD PRESSURE: 69 MMHG | WEIGHT: 192.8 LBS | BODY MASS INDEX: 28.04 KG/M2 | OXYGEN SATURATION: 98 % | SYSTOLIC BLOOD PRESSURE: 126 MMHG

## 2024-11-15 DIAGNOSIS — G45.9 TIA (TRANSIENT ISCHEMIC ATTACK): ICD-10-CM

## 2024-11-15 DIAGNOSIS — G45.9 TIA (TRANSIENT ISCHEMIC ATTACK): Primary | ICD-10-CM

## 2024-11-15 LAB
ANION GAP SERPL CALC-SCNC: 12 MMOL/L (ref 10–20)
BUN SERPL-MCNC: 12 MG/DL (ref 6–23)
CALCIUM SERPL-MCNC: 7.6 MG/DL (ref 8.6–10.3)
CHLORIDE SERPL-SCNC: 107 MMOL/L (ref 98–107)
CO2 SERPL-SCNC: 24 MMOL/L (ref 21–32)
CREAT SERPL-MCNC: 0.9 MG/DL (ref 0.5–1.3)
EGFRCR SERPLBLD CKD-EPI 2021: 86 ML/MIN/1.73M*2
GLUCOSE SERPL-MCNC: 112 MG/DL (ref 74–99)
POTASSIUM SERPL-SCNC: 4.3 MMOL/L (ref 3.5–5.3)
SODIUM SERPL-SCNC: 139 MMOL/L (ref 136–145)

## 2024-11-15 PROCEDURE — 36415 COLL VENOUS BLD VENIPUNCTURE: CPT

## 2024-11-15 PROCEDURE — 80048 BASIC METABOLIC PNL TOTAL CA: CPT

## 2024-11-15 ASSESSMENT — ENCOUNTER SYMPTOMS
CHILLS: 0
ABDOMINAL PAIN: 0
LIGHT-HEADEDNESS: 0
RHINORRHEA: 0
WEAKNESS: 0
HEADACHES: 0
COUGH: 0
SPEECH DIFFICULTY: 0
SHORTNESS OF BREATH: 0
DIZZINESS: 0
NUMBNESS: 0
FEVER: 0

## 2024-11-15 NOTE — PROGRESS NOTES
"Patient: Sydney Fitzpatrick  : 1945  PCP: Colette Henson, APRN-CNP  MRN: [id number]  Program: Transitional Care Management  Status: Enrolled  Effective Dates: 2024 - present  Responsible Staff: Cl Perry RN  Social Drivers to be Addressed: No information to display         Sydney Fitzpatrick is a 79 y.o. male presenting today for follow-up after being discharged from the hospital 3 days ago. The main problem requiring admission was TIA. The discharge summary and/or Transitional Care Management documentation was reviewed. Medication reconciliation was performed as indicated via the \"Foster as Reviewed\" timestamp.     Sydney Fitzpatrick was contacted by Transitional Care Management services two days after his discharge. This encounter and supporting documentation was reviewed.    Patient was initially evaluated November 10, 2024 for strokelike symptoms.    He states initial syptoms were R facial drooping, slurred speech plasting 10 minutes. He had CT head which did not show any intracranial abnormalities.  He then had CTA of neck that showed 40% stenosis to right internal carotid.  CTA of head did not show any large vessel occlusion or stenosis.  He was observed inpatient, it was found that his potassium was low and was replaced.  It was not able to be rechecked as patient left AGAINST MEDICAL ADVICE.  MRI of the brain showed no evidence of stroke, mass, bleeding or other abnormalities that would cause symptoms.  Neurology was consulted and stated most likely TIA, recommended continuation of home aspirin and Lipitor and also recommended to stay to continue TIA workup.    Per discharge home-going plan he was told to see PCP within 1 week, follow-up with stroke neurology in 2 to 3 weeks, have PCP check potassium levels, follow-up with outpatient echocardiogram.      Review of Systems   Constitutional:  Negative for chills and fever.   HENT:  Negative for congestion and rhinorrhea.    Respiratory:  Negative for cough " and shortness of breath.    Cardiovascular:  Negative for chest pain.   Gastrointestinal:  Negative for abdominal pain.   Neurological:  Negative for dizziness, speech difficulty, weakness, light-headedness, numbness and headaches.       /69 (BP Location: Left arm, Patient Position: Sitting, BP Cuff Size: Adult)   Pulse 98   Temp 36.2 °C (97.2 °F) (Temporal)   Wt 87.5 kg (192 lb 12.8 oz)   SpO2 98%   BMI 28.04 kg/m²     Physical Exam  Constitutional:       General: He is not in acute distress.     Appearance: Normal appearance. He is not ill-appearing.   HENT:      Head: Normocephalic and atraumatic.   Eyes:      Extraocular Movements: Extraocular movements intact.   Cardiovascular:      Rate and Rhythm: Normal rate and regular rhythm.   Pulmonary:      Effort: Pulmonary effort is normal. No respiratory distress.      Breath sounds: Normal breath sounds. No stridor. No wheezing, rhonchi or rales.   Neurological:      General: No focal deficit present.      Mental Status: He is alert and oriented to person, place, and time. Mental status is at baseline.      Cranial Nerves: No cranial nerve deficit.      Sensory: No sensory deficit.      Motor: No weakness.      Coordination: Coordination normal.         The complexity of medical decision making for this patient's transitional care is high.    Assessment/Plan   Problem List Items Addressed This Visit    None  Visit Diagnoses         Codes    TIA (transient ischemic attack)    -  Primary G45.9    Relevant Orders    Transthoracic Echo (TTE) Complete    Basic metabolic panel    Referral to Neurology

## 2024-11-19 ENCOUNTER — PATIENT OUTREACH (OUTPATIENT)
Dept: PRIMARY CARE | Facility: CLINIC | Age: 79
End: 2024-11-19
Payer: MEDICARE

## 2024-11-21 NOTE — PATIENT INSTRUCTIONS
Check the blood work as ordered.  Tylenol as needed for the pain (Do not exceed 1000 mg per dose or 3000 mg in 24 hours).  Follow up as planned with surgery on 12/18/23.  Set up a follow up with GI Dr. Jose Angel Dai [phone].   Reason for call:   [x] Refill   [] Prior Auth  [] Other:     Office:   [x] PCP/Provider -   [] Specialty/Provider -     Medication: valsartan (DIOVAN) 320 MG tablet     Dose/Frequency: Take 1 tablet (320 mg total) by mouth daily     Quantity: 90    Pharmacy: [facility] [place] [facility] [place]  [address] [phone]    Does the patient have enough for 3 days?   [] Yes   [x] No - Send as HP to POD

## 2024-11-22 ENCOUNTER — TELEPHONE (OUTPATIENT)
Dept: PRIMARY CARE | Facility: CLINIC | Age: 79
End: 2024-11-22
Payer: MEDICARE

## 2024-11-22 NOTE — TELEPHONE ENCOUNTER
----- Message from Dave Kerr sent at 11/21/2024  8:09 AM EST -----  Calcium is low would repeat in 1 week to confirm, orders are in

## 2024-11-22 NOTE — TELEPHONE ENCOUNTER
Called and spoke to patient regarding results.   Verbalized understanding and will come in beginning of next week to retest.   [facility] and has restarted them.

## 2024-11-26 ENCOUNTER — LAB (OUTPATIENT)
Dept: LAB | Facility: LAB | Age: 79
End: 2024-11-26
Payer: MEDICARE

## 2024-11-26 DIAGNOSIS — G45.9 TIA (TRANSIENT ISCHEMIC ATTACK): ICD-10-CM

## 2024-11-26 LAB
ANION GAP SERPL CALC-SCNC: 15 MMOL/L (ref 10–20)
BUN SERPL-MCNC: 17 MG/DL (ref 6–23)
CALCIUM SERPL-MCNC: 9.7 MG/DL (ref 8.6–10.3)
CHLORIDE SERPL-SCNC: 105 MMOL/L (ref 98–107)
CO2 SERPL-SCNC: 23 MMOL/L (ref 21–32)
CREAT SERPL-MCNC: 1.07 MG/DL (ref 0.5–1.3)
EGFRCR SERPLBLD CKD-EPI 2021: 70 ML/MIN/1.73M*2
GLUCOSE SERPL-MCNC: 143 MG/DL (ref 74–99)
POTASSIUM SERPL-SCNC: 4.5 MMOL/L (ref 3.5–5.3)
SODIUM SERPL-SCNC: 138 MMOL/L (ref 136–145)

## 2024-11-26 PROCEDURE — 36415 COLL VENOUS BLD VENIPUNCTURE: CPT

## 2024-11-26 PROCEDURE — 80048 BASIC METABOLIC PNL TOTAL CA: CPT

## 2024-11-29 ENCOUNTER — OFFICE VISIT (OUTPATIENT)
Dept: URGENT CARE | Age: 79
End: 2024-11-29
Payer: MEDICARE

## 2024-11-29 ENCOUNTER — ANCILLARY PROCEDURE (OUTPATIENT)
Dept: URGENT CARE | Age: 79
End: 2024-11-29
Payer: MEDICARE

## 2024-11-29 VITALS
SYSTOLIC BLOOD PRESSURE: 152 MMHG | RESPIRATION RATE: 16 BRPM | DIASTOLIC BLOOD PRESSURE: 70 MMHG | HEART RATE: 92 BPM | OXYGEN SATURATION: 97 %

## 2024-11-29 DIAGNOSIS — R07.81 RIB PAIN ON RIGHT SIDE: ICD-10-CM

## 2024-11-29 DIAGNOSIS — R07.81 RIB PAIN ON RIGHT SIDE: Primary | ICD-10-CM

## 2024-11-29 PROCEDURE — 71101 X-RAY EXAM UNILAT RIBS/CHEST: CPT | Mod: RIGHT SIDE

## 2024-11-29 RX ORDER — DICLOFENAC POTASSIUM 50 MG/1
50 TABLET, FILM COATED ORAL 3 TIMES DAILY PRN
Qty: 30 TABLET | Refills: 0 | Status: SHIPPED | OUTPATIENT
Start: 2024-11-29 | End: 2024-12-09

## 2024-11-29 NOTE — PROGRESS NOTES
Subjective   History of Present Illness: Sydney Fitzpatrick is a 79 y.o. male. They present today with a chief complaint of right rib pain (Pt c/o right rib pain following mechanical fall x2 days ago).        Past Medical History  Allergies as of 11/29/2024    (No Known Allergies)       (Not in a hospital admission)       Past Medical History:   Diagnosis Date    Cancer (Multi)     Cataract     Diabetes mellitus (Multi)     Varicella        Past Surgical History:   Procedure Laterality Date    CATARACT EXTRACTION Right     CHOLECYSTECTOMY      CIRCUMCISION, PRIMARY      DENTAL SURGERY      PROSTATE BIOPSY      VASECTOMY      WISDOM TOOTH EXTRACTION          reports that he has been smoking cigarettes, pipe, and cigars. He has a 30 pack-year smoking history. He has never used smokeless tobacco. He reports current alcohol use of about 1.0 standard drink of alcohol per week. He reports current drug use. Drug: Marijuana.    Review of Systems  Review of Systems                               Objective    Vitals:    11/29/24 1031   BP: 152/70   Pulse: 92   Resp: 16   SpO2: 97%     No LMP for male patient.    Physical Exam  Vitals reviewed.   HENT:      Head: Normocephalic and atraumatic.      Nose: Nose normal.      Mouth/Throat:      Mouth: Mucous membranes are moist.   Eyes:      Extraocular Movements: Extraocular movements intact.      Conjunctiva/sclera: Conjunctivae normal.      Pupils: Pupils are equal, round, and reactive to light.   Cardiovascular:      Rate and Rhythm: Normal rate and regular rhythm.   Pulmonary:      Effort: Pulmonary effort is normal.      Breath sounds: Normal breath sounds.   Chest:      Chest wall: Tenderness present.   Skin:     General: Skin is warm.   Neurological:      Mental Status: He is alert and oriented to person, place, and time.   Psychiatric:         Mood and Affect: Mood normal.         Behavior: Behavior normal.             Point of Care Test & Imaging Results from this visit  No  results found for this visit on 11/29/24.   XR ribs right 2 views w chest pa or ap    Result Date: 11/29/2024  Interpreted By:  Severino Muller, STUDY: XR RIBS RIGHT 2 VIEWS WITH CHEST PA OR AP   INDICATION: Signs/Symptoms:Fall.   COMPARISON: None   ACCESSION NUMBER(S): [id number]   ORDERING CLINICIAN: SHAHNAZ VARGAS   FINDINGS: No consolidation, effusion, edema, or pneumothorax.   No evidence of right rib fracture or lesion.       No acute findings right ribs.   Signed by: Severino Muller 11/29/2024 11:16 AM Dictation workstation:   [id number]     Diagnostic study results (if any) were reviewed by Shahnaz Vargas PA-C.    Assessment/Plan   Allergies, medications, history, and pertinent labs/EKGs/Imaging reviewed by Shahnaz Vargas PA-C.     Medical Decision Making  -         Patient is educated about their diagnoses.     -          Discussed medications benefits and adverse effects.     -          Answered all patient’s questions.     -          Patient will call 911 or go to the nearest ED if worsen symptoms .     -          Patient is agreeable to the plan of care and is deemed stable upon discharge.     -          Follow up with your primary care provider in two days.      Orders and Diagnoses  Diagnoses and all orders for this visit:  Rib pain on right side  -     XR ribs right 2 views w chest pa or ap; Future  -     diclofenac (Cataflam) 50 mg tablet; Take 1 tablet (50 mg) by mouth 3 times a day as needed (rib pain) for up to 10 days.      Medical Admin Record      Patient disposition: Home    Electronically signed by Shahnaz Vargas PA-C  11:30 AM

## 2024-12-03 ENCOUNTER — HOSPITAL ENCOUNTER (OUTPATIENT)
Dept: CARDIOLOGY | Facility: CLINIC | Age: 79
Discharge: HOME | End: 2024-12-03
Payer: MEDICARE

## 2024-12-03 DIAGNOSIS — G45.8 OTHER TRANSIENT CEREBRAL ISCHEMIC ATTACKS AND RELATED SYNDROMES: ICD-10-CM

## 2024-12-03 DIAGNOSIS — G45.9 TIA (TRANSIENT ISCHEMIC ATTACK): ICD-10-CM

## 2024-12-03 LAB
AORTIC VALVE PEAK VELOCITY: 1.2 M/S
AV PEAK GRADIENT: 6 MMHG
AVA (PEAK VEL): 4 CM2
EJECTION FRACTION APICAL 4 CHAMBER: 53.6
EJECTION FRACTION: 58 %
LEFT ATRIUM VOLUME AREA LENGTH INDEX BSA: 31.2 ML/M2
LEFT VENTRICLE INTERNAL DIMENSION DIASTOLE: 4.19 CM (ref 3.5–6)
LEFT VENTRICULAR OUTFLOW TRACT DIAMETER: 2.4 CM
MITRAL VALVE E/A RATIO: 0.82
RIGHT VENTRICLE FREE WALL PEAK S': 15 CM/S
RIGHT VENTRICLE PEAK SYSTOLIC PRESSURE: 39.2 MMHG
TRICUSPID ANNULAR PLANE SYSTOLIC EXCURSION: 2.2 CM

## 2024-12-03 PROCEDURE — 93306 TTE W/DOPPLER COMPLETE: CPT

## 2024-12-03 PROCEDURE — 93306 TTE W/DOPPLER COMPLETE: CPT | Performed by: INTERNAL MEDICINE

## 2024-12-09 ENCOUNTER — PATIENT OUTREACH (OUTPATIENT)
Dept: PRIMARY CARE | Facility: CLINIC | Age: 79
End: 2024-12-09
Payer: MEDICARE

## 2024-12-16 ENCOUNTER — APPOINTMENT (OUTPATIENT)
Dept: PRIMARY CARE | Facility: CLINIC | Age: 79
End: 2024-12-16
Payer: MEDICARE

## 2024-12-16 VITALS
WEIGHT: 185.4 LBS | OXYGEN SATURATION: 97 % | TEMPERATURE: 97.3 F | DIASTOLIC BLOOD PRESSURE: 62 MMHG | HEART RATE: 88 BPM | BODY MASS INDEX: 26.54 KG/M2 | SYSTOLIC BLOOD PRESSURE: 142 MMHG | HEIGHT: 70 IN

## 2024-12-16 DIAGNOSIS — I10 HYPERTENSION, UNSPECIFIED TYPE: ICD-10-CM

## 2024-12-16 DIAGNOSIS — I71.40 ABDOMINAL AORTIC ANEURYSM (AAA) WITHOUT RUPTURE, UNSPECIFIED PART (CMS-HCC): ICD-10-CM

## 2024-12-16 DIAGNOSIS — E78.5 HYPERLIPIDEMIA, UNSPECIFIED HYPERLIPIDEMIA TYPE: ICD-10-CM

## 2024-12-16 DIAGNOSIS — H60.61 CHRONIC OTITIS EXTERNA OF RIGHT EAR, UNSPECIFIED TYPE: ICD-10-CM

## 2024-12-16 DIAGNOSIS — C61 CANCER OF PROSTATE (MULTI): ICD-10-CM

## 2024-12-16 DIAGNOSIS — Z00.00 MEDICARE ANNUAL WELLNESS VISIT, SUBSEQUENT: Primary | ICD-10-CM

## 2024-12-16 DIAGNOSIS — E11.9 TYPE 2 DIABETES MELLITUS WITHOUT COMPLICATION, WITHOUT LONG-TERM CURRENT USE OF INSULIN (MULTI): ICD-10-CM

## 2024-12-16 PROCEDURE — 1123F ACP DISCUSS/DSCN MKR DOCD: CPT | Performed by: NURSE PRACTITIONER

## 2024-12-16 PROCEDURE — 1159F MED LIST DOCD IN RCRD: CPT | Performed by: NURSE PRACTITIONER

## 2024-12-16 PROCEDURE — 1158F ADVNC CARE PLAN TLK DOCD: CPT | Performed by: NURSE PRACTITIONER

## 2024-12-16 PROCEDURE — 1170F FXNL STATUS ASSESSED: CPT | Performed by: NURSE PRACTITIONER

## 2024-12-16 PROCEDURE — 1160F RVW MEDS BY RX/DR IN RCRD: CPT | Performed by: NURSE PRACTITIONER

## 2024-12-16 PROCEDURE — G0439 PPPS, SUBSEQ VISIT: HCPCS | Performed by: NURSE PRACTITIONER

## 2024-12-16 PROCEDURE — 3078F DIAST BP <80 MM HG: CPT | Performed by: NURSE PRACTITIONER

## 2024-12-16 PROCEDURE — 3077F SYST BP >= 140 MM HG: CPT | Performed by: NURSE PRACTITIONER

## 2024-12-16 PROCEDURE — 99214 OFFICE O/P EST MOD 30 MIN: CPT | Performed by: NURSE PRACTITIONER

## 2024-12-16 RX ORDER — CIPROFLOXACIN AND DEXAMETHASONE 3; 1 MG/ML; MG/ML
4 SUSPENSION/ DROPS AURICULAR (OTIC) 2 TIMES DAILY
Qty: 7.5 ML | Refills: 0 | Status: SHIPPED | OUTPATIENT
Start: 2024-12-16 | End: 2024-12-23

## 2024-12-16 ASSESSMENT — ENCOUNTER SYMPTOMS
CHILLS: 0
FEVER: 0
DIARRHEA: 0
COUGH: 0
SHORTNESS OF BREATH: 0
NUMBNESS: 0
CHEST TIGHTNESS: 0
VOMITING: 0
PALPITATIONS: 0
ABDOMINAL PAIN: 0
HEADACHES: 0
NAUSEA: 0
FATIGUE: 0
DIZZINESS: 0
WEAKNESS: 0

## 2024-12-16 ASSESSMENT — ACTIVITIES OF DAILY LIVING (ADL)
BATHING: INDEPENDENT
DRESSING: INDEPENDENT
GROCERY_SHOPPING: INDEPENDENT
TAKING_MEDICATION: INDEPENDENT
MANAGING_FINANCES: INDEPENDENT
DOING_HOUSEWORK: NEEDS ASSISTANCE

## 2024-12-16 NOTE — PATIENT INSTRUCTIONS
Continue to focus on a healthy diet and exercise.   [facility]. [facility].  Will have you follow up in 6 months or sooner if needed.

## 2024-12-16 NOTE — ASSESSMENT & PLAN NOTE
Better on recheck but sporadic elevated readings in office. Recommended to get an upper arm electric cuff and check at home.

## 2024-12-16 NOTE — PROGRESS NOTES
"Subjective   Reason for Visit: Sydney Fitzpatrick is an 79 y.o. male here for a Medicare Wellness visit.     Past Medical, Surgical, and Family History reviewed and updated in chart.    Reviewed all medications by prescribing practitioner or clinical pharmacist (such as prescriptions, OTCs, herbal therapies and supplements) and documented in the medical record.    HPI  He presents to the office today for AWV and follow up.  He is tolerating medications well at current dose- no side effects. (+) occasional diarrhea from the Metformin- generally takes on an empty stomach though.  No chest pain, shortness of breath, palpitations or edema. No headaches, numbness, tingling, weakness or vision changes.  (+) occasionally has tingling in the left upper arm- lasts a minute and a half when laying on that side- goes away with repositioning.  No dizziness.  Home blood pressure readings. 122/82 (wrist cuff)  Last eye exam: last week.   Diet: \"Wonderful\"- very healthy  Exercise: Exercise programs at [employer]- goes twice a week.  Weight: down 4 lbs     He also reports some drainage and crusting to the right ear for about 3 months.  Never any blood.  No pain. (+) occasionally itchy.  No fever or chills.     Last labs: Reviewed recent labs (BMP, lipids, hemoglobin A1C, CBC) .    He reports he fell and hit his rib about a month ago. No fractures but was started on Diclofenac. Only taking sporadically now.    Has a virtual visit with neurology in middle of January for a follow up on TIA.     Patient Care Team:  JACQUE Ryan as PCP - General  JACQUE Ryan as PCP - [employer] PCP  Cl Perry RN as Care Manager (Case Management)     Review of Systems   Constitutional:  Negative for chills, fatigue and fever.   HENT:  Positive for ear discharge. Negative for ear pain and hearing loss.    Eyes:  Negative for visual disturbance.   Respiratory:  Negative for cough, chest tightness and shortness " "of breath.    Cardiovascular:  Negative for chest pain, palpitations and leg swelling.   Gastrointestinal:  Negative for abdominal pain, diarrhea, nausea and vomiting.   Neurological:  Negative for dizziness, weakness, numbness and headaches.     Objective   Vitals:  /62   Pulse 88   Temp 36.3 °C (97.3 °F) (Temporal)   Ht 1.767 m (5' 9.57\")   Wt 84.1 kg (185 lb 6.4 oz)   SpO2 97%   BMI 26.93 kg/m²       Physical Exam  Constitutional:       General: He is not in acute distress.     Appearance: Normal appearance. He is not toxic-appearing.   Eyes:      Extraocular Movements: Extraocular movements intact.      Conjunctiva/sclera: Conjunctivae normal.      Pupils: Pupils are equal, round, and reactive to light.   Cardiovascular:      Rate and Rhythm: Normal rate and regular rhythm. Occasional Extrasystoles are present.     Pulses: Normal pulses.      Heart sounds: Normal heart sounds, S1 normal and S2 normal. No murmur heard.  Pulmonary:      Effort: Pulmonary effort is normal. No respiratory distress.      Breath sounds: Normal breath sounds.   Abdominal:      General: Bowel sounds are normal.      Palpations: Abdomen is soft.      Tenderness: There is no abdominal tenderness.   Musculoskeletal:      Right lower leg: No edema.      Left lower leg: No edema.   Lymphadenopathy:      Cervical: No cervical adenopathy.   Neurological:      Mental Status: He is alert and oriented to person, place, and time.   Psychiatric:         Attention and Perception: Attention normal.         Mood and Affect: Mood and affect normal.         Behavior: Behavior normal. Behavior is cooperative.         Thought Content: Thought content normal.         Cognition and Memory: Cognition normal.         Judgment: Judgment normal.       Assessment & Plan  Cancer of prostate (Multi)  Follows with oncology for management.         Hyperlipidemia, unspecified hyperlipidemia type  Stable. Continue Atorvastatin at the current dose.    "     Abdominal aortic aneurysm (AAA) without rupture, unspecified part (CMS-HCC)  Patient reports this is ordered by [facility] provider- annual CT to monitor.          Hypertension, unspecified type  Better on recheck but sporadic elevated readings in office. Recommended to get an upper arm electric cuff and check at home.          Type 2 diabetes mellitus without complication, without long-term current use of insulin (Multi)  Well controlled on current medications. Continue.          Medicare annual wellness visit, subsequent  UTD on age appropriate screenings and vaccines.        Chronic otitis externa of right ear, unspecified type    Orders:    ciprofloxacin-dexamethasone (CiproDEX) otic suspension; Administer 4 drops into the right ear 2 times a day for 7 days.

## 2025-01-17 ENCOUNTER — OFFICE VISIT (OUTPATIENT)
Dept: NEUROLOGY | Facility: CLINIC | Age: 80
End: 2025-01-17
Payer: MEDICARE

## 2025-01-17 VITALS
HEART RATE: 101 BPM | HEIGHT: 71 IN | BODY MASS INDEX: 26.04 KG/M2 | WEIGHT: 186 LBS | SYSTOLIC BLOOD PRESSURE: 120 MMHG | DIASTOLIC BLOOD PRESSURE: 72 MMHG

## 2025-01-17 DIAGNOSIS — F17.210 TOBACCO DEPENDENCE DUE TO CIGARETTES: ICD-10-CM

## 2025-01-17 DIAGNOSIS — Z86.73 HISTORY OF TIA (TRANSIENT ISCHEMIC ATTACK): Primary | ICD-10-CM

## 2025-01-17 DIAGNOSIS — I65.21 CAROTID STENOSIS, RIGHT: ICD-10-CM

## 2025-01-17 PROCEDURE — 3074F SYST BP LT 130 MM HG: CPT | Performed by: PSYCHIATRY & NEUROLOGY

## 2025-01-17 PROCEDURE — 1160F RVW MEDS BY RX/DR IN RCRD: CPT | Performed by: PSYCHIATRY & NEUROLOGY

## 2025-01-17 PROCEDURE — 1123F ACP DISCUSS/DSCN MKR DOCD: CPT | Performed by: PSYCHIATRY & NEUROLOGY

## 2025-01-17 PROCEDURE — 1159F MED LIST DOCD IN RCRD: CPT | Performed by: PSYCHIATRY & NEUROLOGY

## 2025-01-17 PROCEDURE — 99205 OFFICE O/P NEW HI 60 MIN: CPT | Performed by: PSYCHIATRY & NEUROLOGY

## 2025-01-17 PROCEDURE — 3078F DIAST BP <80 MM HG: CPT | Performed by: PSYCHIATRY & NEUROLOGY

## 2025-01-17 PROCEDURE — 99215 OFFICE O/P EST HI 40 MIN: CPT | Performed by: PSYCHIATRY & NEUROLOGY

## 2025-01-17 PROCEDURE — 4004F PT TOBACCO SCREEN RCVD TLK: CPT | Performed by: PSYCHIATRY & NEUROLOGY

## 2025-01-17 ASSESSMENT — MINI MENTAL STATE EXAM
NAME OR REPEAT 3 OBJECTS - (APPLE, TABLE, PENNY) OR (BALL, TREE, FLAG): 3 CORRECT
SUM ALL MMSE QUESTIONS FOR TOTAL SCORE [OUT OF 30].: 26
WHAT IS THE YEAR, SEASON, DATE, DAY, AND MONTH: 5 CORRECT
SAY: I WOULD LIKE YOU TO REPEAT THIS PHRASE AFTER ME: NO IFS, ANDS, OR BUTS.: 1 CORRECT
HAND THE PERSON A PENCIL AND PAPER. SAY:  WRITE ANY COMPLETE SENTENCE ON THAT PIECE OF PAPER. (NOTE: THE SENTENCE MUST MAKE SENSE.  IGNORE SPELLING ERRORS): 1 CORRECT
PLACE DESIGN, ERASER AND PENCIL IN FRONT OF THE PERSON.  SAY:  COPY THIS DESIGN PLEASE.  SHOW: DESIGN. ALLOW: MULTIPLE TRIES. WAIT UNTIL PERSON IS FINISHED AND HANDS IT BACK. SCORE: ONLY FOR DIAGRAM WITH 4-SIDED FIGURE BETWEEN TWO 5-SIDED FIGURES: 1 CORRECT
RECALL THE 3 OBJECTS FROM ABOVE (APPLE, TABLE, PENNY) OR (BALL, TREE, FLAG): 3 CORRECT
PLEASE COPY THIS PICTURE (NOTE ALL 10 ANGLES MUST BE PRESENT AND TWO MUST INTERSECT): 1 CORRECT
SPELL THE WORD WORLD FORWARD AND BACKWARDS OR SERIAL 7S: 1 CORRECT
SHOW: PENCIL [OBJECT] ASK: WHAT IS THIS CALLED?: 2 CORRECT
SAY:  READ THE WORDS ON THE PAGE AND THEN DO WHAT IT SAYS.  THEN HAND THE PERSON THE SHEET WITH CLOSE YOUR EYES ON IT.  IF THE SUBJECT READS AND DOES NOT CLOSE THEIR EYES, REPEAT UP TO THREE TIMES.  SCORE ONLY IF SUBJECT CLOSES EYES.: 3 CORRECT
WHAT STATE, COUNTRY, CITY, HOSPITAL, FLOOR: 5 CORRECT

## 2025-01-17 ASSESSMENT — ACTIVITIES OF DAILY LIVING (ADL)
STAIRS: INDEPENDENT
BOWELS: INDEPENDENT (INCLUDING BUTTONS, ZIPS, LACES, ETC.)
BED_TO_CHAIR_AND_BACK: INDEPENDENT
FEEDING: INDEPENDENT
TOTAL_SCORE: 100
DRESSING: INDEPENDENT (INCLUDING BUTTONS, ZIPS, LACES,ETC.)
MOBILITY_LEVEL_SURFACES: INDEPENDENT (BUT MAY USE ANY AID FOR EXAMPLE, STICK) > 50 YARDS
BLADDER: CONTINENT
TOILET_USE: INDEPENDENT (ON AND OFF, DRESSING, WIPING)
BATHING: INDEPENDENT (OR IN SHOWER)
GROOMING: INDEPENDENT FACE/HAIR/TEETH.SHAVING (IMPLEMENTS PROVIDED)

## 2025-01-17 ASSESSMENT — ENCOUNTER SYMPTOMS
DEPRESSION: 0
LOSS OF SENSATION IN FEET: 0
OCCASIONAL FEELINGS OF UNSTEADINESS: 0

## 2025-01-17 NOTE — PROGRESS NOTES
"   [facility]   Sydney Fitzpatrick is a 79 y.o. year old male presenting for neurologic evaluation.   Referred by: Dave Kerr MD  PCP: Colette Henson, YVON-CNP    01/17/25- Consult for TIA.  In November, presented to [facility] with slurred speech, R facial droop with high blood pressure >208 at home.  Called EMS, resolved on assessment in ER.  No other associated/ cardiac symptoms.    CT/ MRI- no acute stroke.  CTA DEJUAN < 50% stenosis, no LVO. EKG NSR with Holter 4/2024- SVT but no afib.   Treated for low K, Na.  Aspirin continued, left AMA.  Post-dC had TTE - EF 55% with LVH, normal LA, no bubble study.  Monitoring for aortic aneurysm.    Today- no new events.   No residual symptoms.   Vascular risk factors- HTN, HPL,- low HDL, DM- A1c 5.8%, smoking. FH- longevity 90-100s  Would consider taking chantix and has support of wife.    Lives in shelter community- does laundry, has meals, does activities.  Writes and lectures about history.  Does his B12 injections monthly.    Advance directives- full code- OK with resuscitation in event of cardiac arrest to allow time for options but does not want prolonged support if not expected to improve quality of life.  Aware of his stg 4 prostate cancer diagnosis and \"at peace with this\".      Extensive review of notes in EMR, labs, tests, Interpretation of neuroimaging  CT head wo IV contrast    Result Date: 11/10/2024  IMPRESSION: CT HEAD: 1  .There is no evidence of acute intracranial hemorrhage. 2.  There is no CT evidence of acute ischemic change CTA NECK: 1.  40% narrowing of the right internal carotid artery 2.  Irregular ulcerated plaque in the descending aortic arch noted CTA HEAD: 1.  There is no evidence of intracranial stenosis, large vessel occlusion or other vascular abnormality Arterial blood flow was measured to detect acute large vessel occlusion by computer aided detection software: Not Performed. Concordance between " "software and imaging review: Not Applicable. : [facility]   Transcribe Date/Time: Nov 10 2024  6:51P Dictated by : SANDRA POLANCO MD This examination was interpreted and the report reviewed and electronically signed by: SANDRA POLANCO MD on Nov 10 2024  7:22PM  EST    CT head wo IV contrast    Result Date: 3/30/2024  IMPRESSION: No CT evidence of an acute intracranial process. Mild chronic microvascular ischemic change throughout the supratentorial white matter and mild generalized atrophy. : [facility]   Transcribe Date/Time: Mar 30 2024  4:25P Dictated by : LILIA HANNAH MD This examination was interpreted and the report reviewed and electronically signed by: LILIA HANNAH MD on Mar 30 2024  4:30PM  EST   MR brain wo IV contrast    Result Date: 11/11/2024  IMPRESSION: 1.  No evidence of acute ischemic infarct. 2.  Minimal chronic small vessel ischemic changes of the supratentorial white matter. 3.  Generalized brain parenchymal volume loss with superimposed component of central white matter volume loss. : [facility]   Transcribe Date/Time: Nov 11 2024  7:51A Dictated by : PHONG BUTTERFIELD MD This examination was interpreted and the report reviewed and electronically signed by: PHONG BUTTERFIELD MD on Nov 11 2024  8:00AM  EST   No results found for this or any previous visit (from the past 4464 hours).  No echocardiogram results found for the past 12 months     No results found for: \"CHOL\", \"TRIG\", \"HDL\", \"CHHDL\", \"LDLF\", \"VLDL\", \"NHDL\"  Lab Results   Component Value Date    HGBA1C 5.8 (H) 11/11/2024     Lab Results   Component Value Date    GLUCOSE 143 (H) 11/26/2024     11/26/2024    K 4.5 11/26/2024     11/26/2024    CO2 23 11/26/2024    ANIONGAP 15 11/26/2024    BUN 17 11/26/2024    CREATININE 1.07 11/26/2024    CALCIUM 9.7 11/26/2024    ALBUMIN 4.2 09/10/2024     Lab Results   Component Value Date    CALCIUM 9.7 11/26/2024    PROT 7.2 09/10/2024    ALBUMIN 4.2 " "09/10/2024    AST 14 09/10/2024    ALT 14 09/10/2024    ALKPHOS 99 09/10/2024    BILITOT 1.0 09/10/2024     Lab Results   Component Value Date    WBC 5.7 11/28/2023    HGB 13.3 (L) 11/28/2023    HCT 41.1 11/28/2023    MCV 97 11/28/2023     11/28/2023   No results found for: \"INR\"No results found for: \"TSH\"    Relevant ROS, Problem list, Past Medical/ Surgical/ Family/ Social history- reviewed and pertinent details noted in history.     Objective     Visit Vitals  /72   Pulse 101   Ht 1.791 m (5' 10.5\")   Wt 84.4 kg (186 lb)   BMI 26.31 kg/m²   Smoking Status Some Days   BSA 2.05 m²       Neuro Scores/ Scales:   Modified [place] (mRS) Modified [place] Score: No symptoms.   Barthel Index  Feeding: independent  Bathing: independent (or in shower)  Grooming: independent face/hair/teeth.shaving (implements provided)  Dressing : independent (including buttons, zips, laces,etc.)  Bowels: independent (including buttons, zips, laces, etc.)  Bladder: continent  Toilet Use : independent (on and off, dressing, wiping)  Transfers (Bed to chair and back) : independent  Mobility (On level surfaces): independent (but may use any aid for example, stick) > 50 yards  Stairs: independent  Total (0-100): 100   NIHSS: NIH Stroke Scale 1A. Level of Consciousness: Alert, Keenly Responsive, 1B. Ask Month and Age: Both Questions Right, 1C. Blink Eyes & Squeeze Hands: Performs Both Tasks, 2. Best Gaze: Normal, 3. Visual: No Visual Loss, 4. Facial Palsy: Normal Symmetrical Movements, 5A. Motor - Left Arm: No Drift, 5B. Motor - Right Arm: No Drift, 6A. Motor - Left Leg: No Drift, 6B. Motor - Right Leg: No Drift, 7. Limb Ataxia: Absent, 8. Sensory Loss: Normal, 9. Best Language: No Aphasia, 10. Dysarthria: Normal, 11. Extinction and Inattention: No Abnormality, NIH Stroke Scale: 0   MMSE: MMSE Total Score:: 26  Serial 7s- does them in 9-10 increments so missed 4 points there but otherwise did well, including drawing cube, clock. " "    Assessment/Plan   1. History of TIA (transient ischemic attack)    2. Carotid stenosis, right    3. Tobacco dependence due to cigarettes      PLAN Reviewed continue aspirin, monitoring of symptoms, focusing on smoking cessation as major opportunity to improve health.    Reviewed Goals for STROKE PREVENTION- recommendations to reduce the risk of vascular events and promote brain health include monitoring and management of vascular risk factors and lifestyle choices to goal values.     Blood pressure- Normal BP is <120/80 mmHg.  Blood Pressure : Goal is less than 130/80 mmHg  Cholesterol- Ideal lipid profile is an LDL-cholesterol <70 mg/dl, total cholesterol <200 mg/dl, fasting triglycerides < 150 mg/dl and HDL-cholesterol >55 mg/dl.   Blood sugar- Blood Sugar : Goal is normal fasting sugar between  mg/dl   Healthy physical activity- Goal is a moderate level of exercise at least 30 minutes/day, most days of the week.   Healthy weight- Goal is an ideal weight with a waistline <40\" for men or <35\" for women, and BMI of 18.5-25.   Healthy diet- is rich in vegetables, fruits, whole grains, legumes and fish, low in salt, and avoids red meats and processed/ refined foods.   Healthy sleep- is restorative, ~7 hours/night, with identification and treatment of obstructive/ central sleep apnea that increases the risk of stroke and heart disease.   Smoking- Goal is to stop smoking any tobacco product and avoid second-hand smoke. For help to quit all forms of tobacco, call 9-843-QUIT-NOW (1-962.824.7239) to speak with a specialist at the [place] [facility].    Alcohol- Goal is moderation; no more than 2 servings for men and 1 serving for non-pregnant women.   Drug use- Goal is avoidance of illicit drugs that can cause blood pressure spikes and damage to blood vessels.   Stroke Warning Signs- know the symptoms of stroke and the importance of calling 911/EMS to access the quickest treatment.     No orders of the defined types " were placed in this encounter.

## 2025-01-17 NOTE — ACP (ADVANCE CARE PLANNING)
Confirming Previous Code Status:   Advance Care Planning Note     Discussion Date: 01/17/25   Discussion Participants: patient    The patient wishes to discuss Advance Care Planning today and the following is a brief summary of our discussion.     Patient has capacity to make their own medical decisions: Yes  Health Care Agent/Surrogate Decision Maker documented in chart: Yes    Documents on file and valid:  Advance Directive/Living Will: No   Health Care Power of : No    Communication of Medical Status/Prognosis:   Aware of prostate cancer diagnosis but no incipient illness threatening immediate survival.     Communication of Treatment Goals/Options:   Would accept CPR/ ventilator in the event of a sudden cardiac arrest if there was a straightforward treatment that would restore health.    Does not want care that would not improve quality, eg does not want prolonged ventilation.       Treatment Decisions  Goals of Care: quality of life is prioritized; willing to accept low-burden treatments to achieve meaningful goals     Follow Up Plan  To reassess in the event of a serious turn in his condition.     Team Members  Shanelle Tillman MD    Time Statement: Total face to face time spent on advance care planning was 12 minutes with 12 minutes spent in counseling, including the explanation.    Shanelle Tillman MD  1/17/2025 5:13 PM

## 2025-02-10 ENCOUNTER — PATIENT OUTREACH (OUTPATIENT)
Dept: PRIMARY CARE | Facility: CLINIC | Age: 80
End: 2025-02-10
Payer: MEDICARE

## 2025-04-14 ENCOUNTER — OFFICE VISIT (OUTPATIENT)
Dept: PRIMARY CARE | Facility: CLINIC | Age: 80
End: 2025-04-14
Payer: MEDICARE

## 2025-04-14 VITALS
TEMPERATURE: 98 F | HEART RATE: 108 BPM | DIASTOLIC BLOOD PRESSURE: 44 MMHG | OXYGEN SATURATION: 97 % | SYSTOLIC BLOOD PRESSURE: 79 MMHG

## 2025-04-14 DIAGNOSIS — J06.9 VIRAL UPPER RESPIRATORY TRACT INFECTION: ICD-10-CM

## 2025-04-14 DIAGNOSIS — I95.9 HYPOTENSION, UNSPECIFIED HYPOTENSION TYPE: Primary | ICD-10-CM

## 2025-04-14 PROCEDURE — 99214 OFFICE O/P EST MOD 30 MIN: CPT | Performed by: NURSE PRACTITIONER

## 2025-04-14 PROCEDURE — 3078F DIAST BP <80 MM HG: CPT | Performed by: NURSE PRACTITIONER

## 2025-04-14 PROCEDURE — 4004F PT TOBACCO SCREEN RCVD TLK: CPT | Performed by: NURSE PRACTITIONER

## 2025-04-14 PROCEDURE — 1159F MED LIST DOCD IN RCRD: CPT | Performed by: NURSE PRACTITIONER

## 2025-04-14 PROCEDURE — 1123F ACP DISCUSS/DSCN MKR DOCD: CPT | Performed by: NURSE PRACTITIONER

## 2025-04-14 PROCEDURE — 1160F RVW MEDS BY RX/DR IN RCRD: CPT | Performed by: NURSE PRACTITIONER

## 2025-04-14 PROCEDURE — 3074F SYST BP LT 130 MM HG: CPT | Performed by: NURSE PRACTITIONER

## 2025-04-14 ASSESSMENT — ENCOUNTER SYMPTOMS
SHORTNESS OF BREATH: 0
CHEST TIGHTNESS: 0
PALPITATIONS: 0
WHEEZING: 0
FEVER: 0
DIAPHORESIS: 0
NAUSEA: 0
LIGHT-HEADEDNESS: 1
COUGH: 1
CONSTIPATION: 0
DIARRHEA: 0
VOMITING: 1
CHILLS: 0

## 2025-04-14 NOTE — PROGRESS NOTES
Subjective   Sydney Fitzpatrick is a 80 y.o. male who presents for Dizziness (/).    HPI  He presented to the office today along with his wife. His color seemed off today and he did report he has not felt well over the weekend. He went to stand up to leave and felt very dizzy- had to sit back down. He was added for a visit today.  He reports he felt off and in a fog over the weekend.   Started with runny nose, nasal congestion, PND and cough on Friday afternoon.  No fever or chills.  No body aches.   No CP, SOB or palpitations.   1 episode of vomiting yesterday.   No abdominal pain, nausea, vomiting or diarrhea.  No urinary symptoms.   Took NyQuil Friday night but not since then.     Home /110 this AM. He reports he did not take his BP medications over the weekend but -took medications this AM.  Missed over the weekend due to being ill.     Review of Systems   Constitutional:  Negative for chills, diaphoresis and fever.   Respiratory:  Positive for cough. Negative for chest tightness, shortness of breath and wheezing.    Cardiovascular:  Negative for chest pain, palpitations and leg swelling.   Gastrointestinal:  Positive for vomiting. Negative for constipation, diarrhea and nausea.   Neurological:  Positive for light-headedness.       Objective   BP (!) 79/44   Pulse 108   Temp 36.7 °C (98 °F)   SpO2 97%     Physical Exam  Constitutional:       General: He is not in acute distress.     Comments: Pale in color   Cardiovascular:      Rate and Rhythm: Normal rate and regular rhythm. Occasional Extrasystoles are present.     Heart sounds: Normal heart sounds, S1 normal and S2 normal. No murmur heard.  Pulmonary:      Effort: Pulmonary effort is normal.      Breath sounds: Normal breath sounds and air entry.   Abdominal:      General: Bowel sounds are normal. There is no distension.      Palpations: Abdomen is soft.      Tenderness: There is no abdominal tenderness.   Lymphadenopathy:      Cervical: No cervical  adenopathy.   Neurological:      Mental Status: He is alert and oriented to person, place, and time.   Psychiatric:         Mood and Affect: Mood normal.         Behavior: Behavior normal.         Thought Content: Thought content normal.         Judgment: Judgment normal.         Assessment/Plan   Problem List Items Addressed This Visit    None  Visit Diagnoses       Hypotension, unspecified hypotension type    -  Primary    Viral upper respiratory tract infection            Given hypotension and lightheadedness in the office today, he was sent to the ER by ambulance for further evaluation.     It has been a pleasure seeing you today!

## 2025-05-05 ENCOUNTER — APPOINTMENT (OUTPATIENT)
Dept: NEUROLOGY | Facility: CLINIC | Age: 80
End: 2025-05-05
Payer: MEDICARE

## 2025-06-17 ENCOUNTER — APPOINTMENT (OUTPATIENT)
Dept: PRIMARY CARE | Facility: CLINIC | Age: 80
End: 2025-06-17
Payer: MEDICARE

## 2025-06-17 VITALS
TEMPERATURE: 97.6 F | HEART RATE: 90 BPM | BODY MASS INDEX: 25.83 KG/M2 | DIASTOLIC BLOOD PRESSURE: 54 MMHG | SYSTOLIC BLOOD PRESSURE: 102 MMHG | OXYGEN SATURATION: 94 % | WEIGHT: 182.6 LBS

## 2025-06-17 DIAGNOSIS — G47.33 OBSTRUCTIVE SLEEP APNEA: ICD-10-CM

## 2025-06-17 DIAGNOSIS — C61 CANCER OF PROSTATE (MULTI): ICD-10-CM

## 2025-06-17 DIAGNOSIS — E11.9 TYPE 2 DIABETES MELLITUS WITHOUT COMPLICATION, WITHOUT LONG-TERM CURRENT USE OF INSULIN: ICD-10-CM

## 2025-06-17 DIAGNOSIS — C61 PROSTATE CANCER METASTATIC TO BONE (MULTI): ICD-10-CM

## 2025-06-17 DIAGNOSIS — E78.5 HYPERLIPIDEMIA, UNSPECIFIED HYPERLIPIDEMIA TYPE: ICD-10-CM

## 2025-06-17 DIAGNOSIS — I10 HYPERTENSION, UNSPECIFIED TYPE: Primary | ICD-10-CM

## 2025-06-17 DIAGNOSIS — N52.9 ERECTILE DYSFUNCTION, UNSPECIFIED ERECTILE DYSFUNCTION TYPE: ICD-10-CM

## 2025-06-17 DIAGNOSIS — C79.51 PROSTATE CANCER METASTATIC TO BONE (MULTI): ICD-10-CM

## 2025-06-17 DIAGNOSIS — I71.40 ABDOMINAL AORTIC ANEURYSM (AAA) WITHOUT RUPTURE, UNSPECIFIED PART: ICD-10-CM

## 2025-06-17 DIAGNOSIS — E87.6 HYPOKALEMIA: ICD-10-CM

## 2025-06-17 PROCEDURE — 1158F ADVNC CARE PLAN TLK DOCD: CPT | Performed by: NURSE PRACTITIONER

## 2025-06-17 PROCEDURE — 99214 OFFICE O/P EST MOD 30 MIN: CPT | Performed by: NURSE PRACTITIONER

## 2025-06-17 PROCEDURE — 1159F MED LIST DOCD IN RCRD: CPT | Performed by: NURSE PRACTITIONER

## 2025-06-17 PROCEDURE — 3074F SYST BP LT 130 MM HG: CPT | Performed by: NURSE PRACTITIONER

## 2025-06-17 PROCEDURE — 1160F RVW MEDS BY RX/DR IN RCRD: CPT | Performed by: NURSE PRACTITIONER

## 2025-06-17 PROCEDURE — 3078F DIAST BP <80 MM HG: CPT | Performed by: NURSE PRACTITIONER

## 2025-06-17 RX ORDER — LISINOPRIL 20 MG/1
20 TABLET ORAL
Qty: 10 TABLET | Refills: 0 | Status: SHIPPED | OUTPATIENT
Start: 2025-06-17

## 2025-06-17 ASSESSMENT — ENCOUNTER SYMPTOMS
WEAKNESS: 0
CHEST TIGHTNESS: 0
NUMBNESS: 0
CHILLS: 0
VOMITING: 0
FATIGUE: 0
COUGH: 0
NAUSEA: 0
FEVER: 0
ABDOMINAL PAIN: 0
DIARRHEA: 1
DIZZINESS: 0
PALPITATIONS: 0
HEADACHES: 0
SHORTNESS OF BREATH: 0

## 2025-06-17 ASSESSMENT — PATIENT HEALTH QUESTIONNAIRE - PHQ9
2. FEELING DOWN, DEPRESSED OR HOPELESS: NOT AT ALL
SUM OF ALL RESPONSES TO PHQ9 QUESTIONS 1 AND 2: 0
1. LITTLE INTEREST OR PLEASURE IN DOING THINGS: NOT AT ALL

## 2025-06-17 NOTE — PROGRESS NOTES
Subjective   Sydney Fitzpatrick is a 80 y.o. male who presents for 6 mon fu and Med Refill.    Med Refill  Pertinent negatives include no abdominal pain, chest pain, chills, coughing, fatigue, fever, headaches, nausea, numbness, vomiting or weakness.     He presents to the office today for a 6 month follow up along with his wife.  He is tolerating medications well at current dose- no side effects. [facility]. Is having issues with thyroid in blood work but not sure of the exact details. [facility] to discuss further. No chest pain, shortness of breath, palpitations or edema. He reports a cough for the past couple of days due to PND. No SOB or wheezing.  No headaches, numbness, tingling, weakness or vision changes.  No dizziness.  Home blood pressure readings: 125/80  Last eye exam: 1 year ago  Diet: Overall eating healthy   Exercise: played volleyball yesterday and will [employer] ball.   Weight: down 3 lbs since 12/2025    Last labs: [facility]- he will get me a copy.    Review of Systems   Constitutional:  Negative for chills, fatigue and fever.   Eyes:  Negative for visual disturbance.   Respiratory:  Negative for cough, chest tightness and shortness of breath.    Cardiovascular:  Negative for chest pain, palpitations and leg swelling.   Gastrointestinal:  Positive for diarrhea. Negative for abdominal pain, nausea and vomiting.        Feels Metformin causes diarrhea at times.   Neurological:  Negative for dizziness, weakness, numbness and headaches.       Objective   /54 (BP Location: Left arm, Patient Position: Sitting)   Pulse 90   Temp 36.4 °C (97.6 °F) (Temporal)   Wt 82.8 kg (182 lb 9.6 oz)   SpO2 94%   BMI 25.83 kg/m²     Physical Exam  Constitutional:       General: He is not in acute distress.     Appearance: Normal appearance. He is not toxic-appearing.   Eyes:      Extraocular Movements: Extraocular movements  intact.      Conjunctiva/sclera: Conjunctivae normal.      Pupils: Pupils are equal, round, and reactive to light.   Cardiovascular:      Rate and Rhythm: Normal rate and regular rhythm.      Pulses: Normal pulses.      Heart sounds: Normal heart sounds, S1 normal and S2 normal. No murmur heard.     Comments: Trace edema at sock line  Pulmonary:      Effort: Pulmonary effort is normal. No respiratory distress.      Breath sounds: Normal breath sounds.   Abdominal:      General: Bowel sounds are normal.      Palpations: Abdomen is soft.      Tenderness: There is no abdominal tenderness.   Musculoskeletal:      Right lower leg: No edema.      Left lower leg: No edema.   Lymphadenopathy:      Cervical: No cervical adenopathy.   Neurological:      Mental Status: He is alert and oriented to person, place, and time.   Psychiatric:         Attention and Perception: Attention normal.         Mood and Affect: Mood and affect normal.         Behavior: Behavior normal. Behavior is cooperative.         Thought Content: Thought content normal.         Cognition and Memory: Cognition normal.         Judgment: Judgment normal.       Assessment/Plan   Problem List Items Addressed This Visit       Abdominal aortic aneurysm    Patient reports this is [facility] with [facility].             Cancer of prostate (Multi)    He [facility].         Diabetes mellitus, type II (Multi)    Well controlled on current medications. Continue.   Requested copy of recent labs.         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         Hyperlipidemia    Stable. Continue Atorvastatin at the current dose.   Requested a copy of recent labs.           Male erectile dysfunction, unspecified    Discussed today- he is not interested in any treatment.         Obstructive sleep apnea    Wears his CPAP every night but reports he has never had a sleep study.         Hypertension -  Primary    A little low in the office today but good at home. No low readings at home and no dizziness reported.  If BP is running low at home SBP <105 advised he cut Amlodipine in half and take 5 mg daily.         Relevant Medications    lisinopril 20 mg tablet    Hypokalemia    He continues on KCL replacement. Requested a copy of recent labs.          It has been a pleasure seeing you today!

## 2025-06-17 NOTE — PATIENT INSTRUCTIONS
Continue to focus on a healthy diet and exercise.   Drop off recent labs drawn at the [facility].  Continue to monitor home BP readings. If BP is running low at home SBP (upper number) <105 cut amlodipine in half and take 5 mg daily.  [facility] with [facility].  Follow up in 6 months for AWV.

## 2025-06-19 NOTE — ASSESSMENT & PLAN NOTE
A little low in the office today but good at home. No low readings at home and no dizziness reported.  If BP is running low at home SBP <105 advised he cut Amlodipine in half and take 5 mg daily.

## 2025-12-16 ENCOUNTER — APPOINTMENT (OUTPATIENT)
Dept: PRIMARY CARE | Facility: CLINIC | Age: 80
End: 2025-12-16
Payer: MEDICARE